# Patient Record
Sex: FEMALE | Race: WHITE | NOT HISPANIC OR LATINO | Employment: OTHER | URBAN - METROPOLITAN AREA
[De-identification: names, ages, dates, MRNs, and addresses within clinical notes are randomized per-mention and may not be internally consistent; named-entity substitution may affect disease eponyms.]

---

## 2023-08-31 PROBLEM — N18.9 CHRONIC RENAL FAILURE SYNDROME: Status: ACTIVE | Noted: 2023-08-31

## 2023-08-31 PROBLEM — M31.6 TEMPORAL ARTERITIS (MULTI): Status: ACTIVE | Noted: 2023-08-31

## 2023-08-31 PROBLEM — E55.9 VITAMIN D DEFICIENCY: Status: ACTIVE | Noted: 2023-08-31

## 2023-08-31 PROBLEM — H00.011 HORDEOLUM EXTERNUM OF RIGHT UPPER EYELID: Status: ACTIVE | Noted: 2023-08-31

## 2023-08-31 PROBLEM — F32.A DEPRESSION: Status: ACTIVE | Noted: 2023-08-31

## 2023-08-31 PROBLEM — E78.2 MIXED HYPERLIPIDEMIA: Status: ACTIVE | Noted: 2023-08-31

## 2023-08-31 PROBLEM — E11.65 HYPERGLYCEMIA DUE TO TYPE 2 DIABETES MELLITUS (MULTI): Status: ACTIVE | Noted: 2023-08-31

## 2023-08-31 PROBLEM — F41.9 ANXIETY: Status: ACTIVE | Noted: 2023-08-31

## 2023-08-31 PROBLEM — G62.9 PERIPHERAL NEUROPATHY: Status: ACTIVE | Noted: 2023-08-31

## 2023-08-31 PROBLEM — K57.92 DIVERTICULITIS: Status: ACTIVE | Noted: 2023-08-31

## 2023-08-31 PROBLEM — I10 ESSENTIAL HYPERTENSION: Status: ACTIVE | Noted: 2023-08-31

## 2023-08-31 RX ORDER — INSULIN DETEMIR 100 [IU]/ML
INJECTION, SOLUTION SUBCUTANEOUS
COMMUNITY
Start: 2023-05-19 | End: 2023-11-21 | Stop reason: WASHOUT

## 2023-08-31 RX ORDER — PREDNISONE 5 MG/1
2.5 TABLET ORAL DAILY
COMMUNITY
Start: 2022-03-31 | End: 2023-11-21 | Stop reason: ALTCHOICE

## 2023-08-31 RX ORDER — SERTRALINE HYDROCHLORIDE 100 MG/1
TABLET, FILM COATED ORAL
COMMUNITY
End: 2023-11-21 | Stop reason: WASHOUT

## 2023-08-31 RX ORDER — METFORMIN HYDROCHLORIDE 500 MG/1
TABLET, EXTENDED RELEASE ORAL
COMMUNITY
Start: 2019-10-16 | End: 2023-11-21 | Stop reason: WASHOUT

## 2023-08-31 RX ORDER — LIDOCAINE 50 MG/G
PATCH TOPICAL
COMMUNITY
Start: 2021-12-30

## 2023-08-31 RX ORDER — ONDANSETRON 4 MG/1
TABLET, FILM COATED ORAL
COMMUNITY
Start: 2017-11-16 | End: 2023-11-21 | Stop reason: WASHOUT

## 2023-08-31 RX ORDER — LISINOPRIL 5 MG/1
TABLET ORAL
COMMUNITY
End: 2023-11-21 | Stop reason: WASHOUT

## 2023-08-31 RX ORDER — SARILUMAB 200 MG/1.14ML
INJECTION, SOLUTION SUBCUTANEOUS
COMMUNITY
End: 2023-11-21 | Stop reason: WASHOUT

## 2023-08-31 RX ORDER — LOVASTATIN 40 MG/1
TABLET ORAL
COMMUNITY
End: 2023-11-21 | Stop reason: WASHOUT

## 2023-08-31 RX ORDER — METOPROLOL SUCCINATE 50 MG/1
TABLET, EXTENDED RELEASE ORAL
COMMUNITY
End: 2023-11-21 | Stop reason: WASHOUT

## 2023-08-31 RX ORDER — ZOLPIDEM TARTRATE 5 MG/1
TABLET ORAL
COMMUNITY
Start: 2023-05-04 | End: 2023-11-21 | Stop reason: SDUPTHER

## 2023-08-31 RX ORDER — VIT C/E/ZN/COPPR/LUTEIN/ZEAXAN 250MG-90MG
CAPSULE ORAL
COMMUNITY
End: 2023-11-21 | Stop reason: SDUPTHER

## 2023-08-31 RX ORDER — GENTAMICIN SULFATE 3 MG/ML
SOLUTION/ DROPS OPHTHALMIC
COMMUNITY
Start: 2019-06-06 | End: 2023-11-21 | Stop reason: WASHOUT

## 2023-08-31 RX ORDER — CLONAZEPAM 1 MG/1
TABLET ORAL
COMMUNITY
Start: 2022-11-09 | End: 2023-11-21 | Stop reason: WASHOUT

## 2023-08-31 RX ORDER — ASPIRIN 81 MG/1
TABLET ORAL
COMMUNITY
End: 2024-02-21 | Stop reason: SDUPTHER

## 2023-08-31 RX ORDER — ACETAMINOPHEN AND CODEINE PHOSPHATE 300; 30 MG/1; MG/1
TABLET ORAL
COMMUNITY
Start: 2017-03-26 | End: 2023-11-21 | Stop reason: WASHOUT

## 2023-10-03 ENCOUNTER — PHARMACY VISIT (OUTPATIENT)
Dept: PHARMACY | Facility: CLINIC | Age: 83
End: 2023-10-03
Payer: COMMERCIAL

## 2023-10-03 PROCEDURE — RXMED WILLOW AMBULATORY MEDICATION CHARGE

## 2023-10-24 ENCOUNTER — PHARMACY VISIT (OUTPATIENT)
Dept: PHARMACY | Facility: CLINIC | Age: 83
End: 2023-10-24
Payer: COMMERCIAL

## 2023-10-24 PROCEDURE — RXMED WILLOW AMBULATORY MEDICATION CHARGE

## 2023-10-26 ENCOUNTER — PHARMACY VISIT (OUTPATIENT)
Dept: PHARMACY | Facility: CLINIC | Age: 83
End: 2023-10-26
Payer: COMMERCIAL

## 2023-10-26 PROCEDURE — RXMED WILLOW AMBULATORY MEDICATION CHARGE

## 2023-10-28 ENCOUNTER — PHARMACY VISIT (OUTPATIENT)
Dept: PHARMACY | Facility: CLINIC | Age: 83
End: 2023-10-28
Payer: COMMERCIAL

## 2023-10-28 PROCEDURE — RXOTC WILLOW AMBULATORY OTC CHARGE

## 2023-10-28 PROCEDURE — RXMED WILLOW AMBULATORY MEDICATION CHARGE

## 2023-10-31 ENCOUNTER — TELEPHONE (OUTPATIENT)
Dept: PRIMARY CARE | Facility: CLINIC | Age: 83
End: 2023-10-31

## 2023-10-31 ENCOUNTER — LAB REQUISITION (OUTPATIENT)
Dept: LAB | Facility: HOSPITAL | Age: 83
End: 2023-10-31
Payer: MEDICARE

## 2023-10-31 DIAGNOSIS — R30.0 DYSURIA: Primary | ICD-10-CM

## 2023-10-31 DIAGNOSIS — R31.9 HEMATURIA, UNSPECIFIED TYPE: ICD-10-CM

## 2023-10-31 DIAGNOSIS — N39.0 URINARY TRACT INFECTION, SITE NOT SPECIFIED: ICD-10-CM

## 2023-10-31 PROCEDURE — 87186 SC STD MICRODIL/AGAR DIL: CPT

## 2023-10-31 PROCEDURE — 87086 URINE CULTURE/COLONY COUNT: CPT

## 2023-10-31 NOTE — TELEPHONE ENCOUNTER
Pt c/o burning and frequency with urination.  Pt suspects UTI and requesting order for UA    Also complaining of approx 15# weight loss in last 3 months without trying.  Family starting to notice and pt concerned.  Would like to be seen for further eval.  Please advise.  Ph: [phone]

## 2023-11-01 NOTE — TELEPHONE ENCOUNTER
Attempted to reach patient a third time to schedule appointment for tomorrow at 2:30. Still getting a busy signal.

## 2023-11-02 NOTE — TELEPHONE ENCOUNTER
Attempted again to reach patient to schedule. Phone is still busy. No alternative number. Will address for now and wait for a call back.

## 2023-11-03 LAB — BACTERIA UR CULT: ABNORMAL

## 2023-11-21 ENCOUNTER — OFFICE VISIT (OUTPATIENT)
Dept: PRIMARY CARE | Facility: CLINIC | Age: 83
End: 2023-11-21
Payer: MEDICARE

## 2023-11-21 ENCOUNTER — PHARMACY VISIT (OUTPATIENT)
Dept: PHARMACY | Facility: CLINIC | Age: 83
End: 2023-11-21
Payer: COMMERCIAL

## 2023-11-21 VITALS
OXYGEN SATURATION: 94 % | DIASTOLIC BLOOD PRESSURE: 82 MMHG | HEART RATE: 82 BPM | SYSTOLIC BLOOD PRESSURE: 138 MMHG | TEMPERATURE: 97.6 F | BODY MASS INDEX: 25.68 KG/M2 | WEIGHT: 136 LBS | HEIGHT: 61 IN

## 2023-11-21 DIAGNOSIS — R30.0 DYSURIA: ICD-10-CM

## 2023-11-21 DIAGNOSIS — Z79.4 TYPE 2 DIABETES MELLITUS WITH OTHER SPECIFIED COMPLICATION, WITH LONG-TERM CURRENT USE OF INSULIN (MULTI): Primary | ICD-10-CM

## 2023-11-21 DIAGNOSIS — F32.A DEPRESSION, UNSPECIFIED DEPRESSION TYPE: ICD-10-CM

## 2023-11-21 DIAGNOSIS — I10 ESSENTIAL HYPERTENSION: ICD-10-CM

## 2023-11-21 DIAGNOSIS — E78.2 MIXED HYPERLIPIDEMIA: ICD-10-CM

## 2023-11-21 DIAGNOSIS — Z01.89 ENCOUNTER FOR ROUTINE LABORATORY TESTING: ICD-10-CM

## 2023-11-21 DIAGNOSIS — Z86.2 HISTORY OF ANEMIA: ICD-10-CM

## 2023-11-21 DIAGNOSIS — F41.9 ANXIETY: ICD-10-CM

## 2023-11-21 DIAGNOSIS — E11.69 TYPE 2 DIABETES MELLITUS WITH OTHER SPECIFIED COMPLICATION, WITH LONG-TERM CURRENT USE OF INSULIN (MULTI): Primary | ICD-10-CM

## 2023-11-21 DIAGNOSIS — G63 POLYNEUROPATHY ASSOCIATED WITH UNDERLYING DISEASE (MULTI): ICD-10-CM

## 2023-11-21 DIAGNOSIS — E55.9 VITAMIN D DEFICIENCY: ICD-10-CM

## 2023-11-21 PROBLEM — H00.011 HORDEOLUM EXTERNUM OF RIGHT UPPER EYELID: Status: RESOLVED | Noted: 2023-08-31 | Resolved: 2023-11-21

## 2023-11-21 PROBLEM — K57.90 DIVERTICULOSIS: Status: ACTIVE | Noted: 2023-11-21

## 2023-11-21 PROBLEM — Z87.19 HISTORY OF DIVERTICULITIS: Status: ACTIVE | Noted: 2023-11-21

## 2023-11-21 PROBLEM — K57.92 DIVERTICULITIS: Status: RESOLVED | Noted: 2023-08-31 | Resolved: 2023-11-21

## 2023-11-21 PROBLEM — N18.9 CHRONIC RENAL FAILURE SYNDROME: Status: RESOLVED | Noted: 2023-08-31 | Resolved: 2023-11-21

## 2023-11-21 PROBLEM — E11.9 TYPE 2 DIABETES MELLITUS (MULTI): Status: ACTIVE | Noted: 2023-08-31

## 2023-11-21 PROCEDURE — 3079F DIAST BP 80-89 MM HG: CPT | Performed by: STUDENT IN AN ORGANIZED HEALTH CARE EDUCATION/TRAINING PROGRAM

## 2023-11-21 PROCEDURE — 1126F AMNT PAIN NOTED NONE PRSNT: CPT | Performed by: STUDENT IN AN ORGANIZED HEALTH CARE EDUCATION/TRAINING PROGRAM

## 2023-11-21 PROCEDURE — 1159F MED LIST DOCD IN RCRD: CPT | Performed by: STUDENT IN AN ORGANIZED HEALTH CARE EDUCATION/TRAINING PROGRAM

## 2023-11-21 PROCEDURE — 99214 OFFICE O/P EST MOD 30 MIN: CPT | Performed by: STUDENT IN AN ORGANIZED HEALTH CARE EDUCATION/TRAINING PROGRAM

## 2023-11-21 PROCEDURE — 1160F RVW MEDS BY RX/DR IN RCRD: CPT | Performed by: STUDENT IN AN ORGANIZED HEALTH CARE EDUCATION/TRAINING PROGRAM

## 2023-11-21 PROCEDURE — RXMED WILLOW AMBULATORY MEDICATION CHARGE

## 2023-11-21 PROCEDURE — 95251 CONT GLUC MNTR ANALYSIS I&R: CPT | Performed by: STUDENT IN AN ORGANIZED HEALTH CARE EDUCATION/TRAINING PROGRAM

## 2023-11-21 PROCEDURE — 3075F SYST BP GE 130 - 139MM HG: CPT | Performed by: STUDENT IN AN ORGANIZED HEALTH CARE EDUCATION/TRAINING PROGRAM

## 2023-11-21 PROCEDURE — 1036F TOBACCO NON-USER: CPT | Performed by: STUDENT IN AN ORGANIZED HEALTH CARE EDUCATION/TRAINING PROGRAM

## 2023-11-21 RX ORDER — INSULIN GLARGINE 100 [IU]/ML
INJECTION, SOLUTION SUBCUTANEOUS
Start: 2023-11-21 | End: 2024-01-03 | Stop reason: SDUPTHER

## 2023-11-21 RX ORDER — LISINOPRIL 5 MG/1
5 TABLET ORAL DAILY
Qty: 90 TABLET | Refills: 3 | Status: SHIPPED | OUTPATIENT
Start: 2023-11-21 | End: 2024-02-21 | Stop reason: SDUPTHER

## 2023-11-21 RX ORDER — SERTRALINE HYDROCHLORIDE 100 MG/1
100 TABLET, FILM COATED ORAL DAILY
Start: 2023-11-21 | End: 2024-02-21 | Stop reason: SDUPTHER

## 2023-11-21 RX ORDER — LISINOPRIL 5 MG/1
5 TABLET ORAL DAILY
Start: 2023-11-21 | End: 2023-11-21 | Stop reason: SDUPTHER

## 2023-11-21 RX ORDER — LOVASTATIN 40 MG/1
80 TABLET ORAL DAILY
Qty: 180 TABLET | Refills: 3 | Status: SHIPPED | OUTPATIENT
Start: 2023-11-21 | End: 2024-02-21 | Stop reason: SDUPTHER

## 2023-11-21 RX ORDER — ZOLPIDEM TARTRATE 5 MG/1
5 TABLET ORAL NIGHTLY PRN
Qty: 90 TABLET | Refills: 1 | Status: SHIPPED | OUTPATIENT
Start: 2023-11-21 | End: 2024-04-04 | Stop reason: SDUPTHER

## 2023-11-21 RX ORDER — METOPROLOL SUCCINATE 50 MG/1
50 TABLET, EXTENDED RELEASE ORAL DAILY
Start: 2023-11-21 | End: 2024-02-21 | Stop reason: SDUPTHER

## 2023-11-21 RX ORDER — INSULIN GLARGINE 100 [IU]/ML
INJECTION, SOLUTION SUBCUTANEOUS
Start: 2023-11-21 | End: 2023-11-21 | Stop reason: SDUPTHER

## 2023-11-21 RX ORDER — VIT C/E/ZN/COPPR/LUTEIN/ZEAXAN 250MG-90MG
75 CAPSULE ORAL DAILY
Start: 2023-11-21 | End: 2024-02-21 | Stop reason: ALTCHOICE

## 2023-11-21 RX ORDER — LOVASTATIN 40 MG/1
80 TABLET ORAL DAILY
Start: 2023-11-21 | End: 2023-11-21 | Stop reason: SDUPTHER

## 2023-11-21 RX ORDER — CEPHALEXIN 500 MG/1
500 CAPSULE ORAL 4 TIMES DAILY
Qty: 40 CAPSULE | Refills: 0 | Status: SHIPPED | OUTPATIENT
Start: 2023-11-21 | End: 2023-12-01

## 2023-11-21 RX ORDER — METFORMIN HYDROCHLORIDE 500 MG/1
500 TABLET, EXTENDED RELEASE ORAL
Start: 2023-11-21 | End: 2024-02-21 | Stop reason: ALTCHOICE

## 2023-11-21 RX ORDER — ZOLPIDEM TARTRATE 5 MG/1
5 TABLET ORAL NIGHTLY PRN
Start: 2023-11-21 | End: 2023-11-21 | Stop reason: SDUPTHER

## 2023-11-21 ASSESSMENT — ENCOUNTER SYMPTOMS
CARDIOVASCULAR NEGATIVE: 1
CONSTITUTIONAL NEGATIVE: 1
RESPIRATORY NEGATIVE: 1
GASTROINTESTINAL NEGATIVE: 1

## 2023-11-21 ASSESSMENT — PAIN SCALES - GENERAL: PAINLEVEL: 0-NO PAIN

## 2023-11-21 NOTE — PROGRESS NOTES
[facility]: [facility]  PROGRESS NOTE      Sheila Sanchez is a 83 y.o. female that is presenting today for Follow-up (OVERDUE ER FOLLOW UP).    Assessment/Plan   Diagnoses and all orders for this visit:  Type 2 diabetes mellitus with other specified complication, with long-term current use of insulin (CMS/[facility])  Comments:  Not tolerating metformin. Decrease dosing.  Levemir discontinued. Transition to lantus. Will increase insulin slightly to adjust for decreased metformin.  Orders:  -     metFORMIN XR (Glucophage-XR) 500 mg 24 hr tablet; Take 1 tablet (500 mg) by mouth 2 times a day with meals.  -     insulin glargine (Lantus) 100 unit/mL injection; Inject 52 Units under the skin once daily in the morning AND 35 Units once daily at bedtime.  -     Follow Up In Primary Care; Future  -     Hemoglobin A1C; Future  -     Albumin , Urine Random; Future  Essential hypertension  Comments:  No changes at this time.  Orders:  -     metoprolol succinate XL (Toprol-XL) 50 mg 24 hr tablet; Take 1 tablet (50 mg) by mouth once daily.  -     lisinopril 5 mg tablet; Take 1 tablet (5 mg) by mouth once daily.  -     Basic Metabolic Panel; Future  Mixed hyperlipidemia  -     lovastatin (Mevacor) 40 mg tablet; Take 2 tablets (80 mg) by mouth once daily.  -     Hepatic Function Panel; Future  Polyneuropathy associated with underlying disease (CMS/[facility])  Comments:  Stable. No additions at this time.  Depression, unspecified depression type  Comments:  No changes at this time.  Orders:  -     sertraline (Zoloft) 100 mg tablet; Take 1 tablet (100 mg) by mouth once daily.  -     zolpidem (Ambien) 5 mg tablet; Take 1 tablet (5 mg) by mouth as needed at bedtime for sleep.  Vitamin D deficiency  -     cholecalciferol (Vitamin D-3) 25 MCG (1000 UT) capsule; Take 3 capsules (75 mcg) by mouth once daily.  Anxiety  Comments:  No changes at this time.  Orders:  -     sertraline (Zoloft) 100 mg tablet; Take 1 tablet (100  mg) by mouth once daily.  -     zolpidem (Ambien) 5 mg tablet; Take 1 tablet (5 mg) by mouth as needed at bedtime for sleep.  Dysuria  Comments:  Proteus in urine. Patient treated with macrobid followed by ciprofloxacin but still having symptoms.   Will recheck UA.  Orders:  -     POCT UA (nonautomated) manually resulted  -     cephalexin (Keflex) 500 mg capsule; Take 1 capsule (500 mg) by mouth 4 times a day for 10 days.  Encounter for routine laboratory testing  -     CBC and Auto Differential; Future  -     Basic Metabolic Panel; Future  -     Hepatic Function Panel; Future  -     Hemoglobin A1C; Future  -     Albumin , Urine Random; Future  History of anemia  -     CBC and Auto Differential; Future    Subjective   - The patient otherwise feels well and denies any acute symptoms or concerns at this time.  - The patient denies any changes or progression of their chronic medical problems.  - The patient denies any problems or concerns with their medications.      Review of Systems   Constitutional: Negative.    Respiratory: Negative.     Cardiovascular: Negative.    Gastrointestinal: Negative.       Objective   Vitals:    11/21/23 1009   BP: 138/82   Pulse: 82   Temp: 36.4 °C (97.6 °F)   SpO2: 94%      Body mass index is 25.71 kg/m².  Physical Exam  Constitutional:       General: She is not in acute distress.  Neck:      Vascular: No carotid bruit.   Cardiovascular:      Rate and Rhythm: Normal rate and regular rhythm.      Heart sounds: Normal heart sounds.   Pulmonary:      Effort: Pulmonary effort is normal.      Breath sounds: Normal breath sounds.   Musculoskeletal:         General: No swelling.   Neurological:      Mental Status: She is alert. Mental status is at baseline.   Psychiatric:         Mood and Affect: Mood normal.       Diagnostic Results   Lab Results   Component Value Date    GLUCOSE 184 (H) 02/24/2023    CALCIUM 9.6 02/24/2023     02/24/2023    K 4.1 02/24/2023    CO2 25 02/24/2023    CL  "100 02/24/2023    BUN 21 02/24/2023    CREATININE 0.9 02/24/2023     Lab Results   Component Value Date    ALT 15 02/24/2023    AST 19 02/24/2023    ALKPHOS 34 (L) 02/24/2023    BILITOT 0.5 02/24/2023     Lab Results   Component Value Date    WBC 7.6 02/24/2023    HGB 12.8 02/24/2023    HCT 38.8 02/24/2023    MCV 89.8 02/24/2023     02/24/2023     Lab Results   Component Value Date    CHOL 181 02/24/2023    CHOL 172 03/29/2022    CHOL 177 09/28/2021     Lab Results   Component Value Date    HDL 46 (L) 02/24/2023    HDL 47 (L) 03/29/2022    HDL 44 (L) 09/28/2021     Lab Results   Component Value Date    LDLCALC 71 02/24/2023    LDLCALC 73 03/29/2022    LDLCALC 77 09/28/2021     Lab Results   Component Value Date    TRIG 319 (H) 02/24/2023    TRIG 262 (H) 03/29/2022    TRIG 282 (H) 09/28/2021     No components found for: \"CHOLHDL\"  Lab Results   Component Value Date    HGBA1C 7.9 (H) 03/29/2022     Other labs not included in the list above were reviewed either before or during this encounter.    History    No past medical history on file.  No past surgical history on file.  No family history on file.  Social History     Socioeconomic History    Marital status:      Spouse name: Not on file    Number of children: Not on file    Years of education: Not on file    Highest education level: Not on file   Occupational History    Not on file   Tobacco Use    Smoking status: Never    Smokeless tobacco: Never   Substance and Sexual Activity    Alcohol use: Never    Drug use: Never    Sexual activity: Not on file   Other Topics Concern    Not on file   Social History Narrative    Not on file     Social Determinants of Health     Financial Resource Strain: Not on file   Food Insecurity: Not on file   Transportation Needs: Not on file   Physical Activity: Not on file   Stress: Not on file   Social Connections: Not on file   Intimate Partner Violence: Not on file   Housing Stability: Not on file     Allergies "   Allergen Reactions    Alendronate Other     stomach upset    Ibandronate Other     stomach upset    Meperidine Other     stomach upset    Tetanus Toxoid Adsorbed Unknown     Current Outpatient Medications on File Prior to Visit   Medication Sig Dispense Refill    aspirin 81 mg EC tablet 1 tablet Orally Once a day      blood sugar diagnostic strip as directed every 12 hours for 90 days      blood sugar diagnostic strip USE AS DIRECTED EVERY 12 HOURS 200 each 3    FreeStyle Júnior sensor system kit USE AS DIRECTED TO MONITOR BLOOD SUGAR AND CHANGE EVERY 14 DAYS 2 each 11    lidocaine (Lidoderm) 5 % patch 1 patch remove after 12 hours Externally Once a day prn      [DISCONTINUED] cholecalciferol (Vitamin D-3) 25 MCG (1000 UT) capsule 3 capsules Orally Once a day      [DISCONTINUED] insulin detemir (Levemir Flextouch) 100 unit/mL (3 mL) pen INJECT 50 UNITS SUBCUTANEOUSLY EVERY MORNING AND 35 UNITS SUBCUTANEOUSLY EVERY EVENING 45 mL 1    [DISCONTINUED] lisinopril 5 mg tablet TAKE 1 TABLET BY MOUTH ONE TIME DAILY 90 tablet 3    [DISCONTINUED] lovastatin (Mevacor) 40 mg tablet TAKE 2 TABLETS BY MOUTH ONE TIME DAILY 180 tablet 3    [DISCONTINUED] metFORMIN XR (Glucophage-XR) 500 mg 24 hr tablet TAKE 2 TABLETS BY MOUTH EVERY MORNING AND TAKE 1 TABLET BY MOUTH EVERY EVENING 135 tablet 3    [DISCONTINUED] metoprolol succinate XL (Toprol-XL) 50 mg 24 hr tablet 03KE 1 TABLET BY MOUTH ONE TIME DAILY 90 tablet 3    [DISCONTINUED] predniSONE (Deltasone) 5 mg tablet Take 0.5 tablets (2.5 mg) by mouth once daily.      [DISCONTINUED] sertraline (Zoloft) 100 mg tablet TAKE 1 TABLET BY MOUTH ONE TIME DAILY 90 tablet 3    [DISCONTINUED] zolpidem (Ambien) 5 mg tablet 1 tablet at bedtime as needed Orally Once a day      [DISCONTINUED] acetaminophen-codeine (Tylenol w/ Codeine #3) 300-30 mg tablet Take 1-2 tablets every 6-8 hours as needed      [DISCONTINUED] amoxicillin (Amoxil) 500 mg capsule TAKE 1 CAPSULE BY MOUTH THREE TIMES A DAY  UNTIL GONE 21 capsule 0    [DISCONTINUED] clonazePAM (KlonoPIN) 1 mg tablet 1 tablet as needed Orally Twice a day      [DISCONTINUED] gentamicin (Garamycin) 0.3 % ophthalmic solution 2-3 drops in medial canthus of eye every 4 hours while awake x 5 days      [DISCONTINUED] insulin detemir (Levemir FlexPen) 100 unit/mL (3 mL) pen as directed Subcutaneous 50u AM; 35u PM      [DISCONTINUED] insulin detemir (Levemir Flextouch) 100 unit/mL (3 mL) pen INJECT 50 UNITS SUBCUTANEOUSLY EVERY MORNING AND 35 UNITS SUBCUTANEOUSLY EVERY EVENING 45 mL 1    [DISCONTINUED] lisinopril 5 mg tablet 1 tablet Orally Once a day for 90 days      [DISCONTINUED] lovastatin (Mevacor) 40 mg tablet 2 tablets Orally Once a day for 90 days      [DISCONTINUED] metFORMIN  mg 24 hr tablet as directed Orally 2 tablets AM; 1 tablet PM      [DISCONTINUED] metoprolol succinate XL (Toprol XL) 50 mg 24 hr tablet 1 tablet Orally Once a day      [DISCONTINUED] mupirocin (Bactroban) 2 % ointment APPLY TO AFFECTED AREA TWO TIMES A DAY 22 g 0    [DISCONTINUED] ondansetron (Zofran) 4 mg tablet 1 tablet Orally every 6 hours prn      [DISCONTINUED] predniSONE (Deltasone) 5 mg tablet TAKE 1 TABLET BY MOUTH ONE TIME DAILY. 90 tablet 1    [DISCONTINUED] sarilumab (Kevzara) 200 mg/1.14 mL injection as directed Subcutaneous once a month      [DISCONTINUED] sertraline (Zoloft) 100 mg tablet 1 tablet Orally Once a day      [DISCONTINUED] zolpidem (Ambien) 5 mg tablet TAKE 1 TABLET BY MOUTH EVERY DAY AT BEDTIME. 90 tablet 1     No current facility-administered medications on file prior to visit.     Immunization History   Administered Date(s) Administered    DT (pediatric) 08/21/2023    Flu vaccine, quadrivalent, high-dose, preservative free, age 65y+ (FLUZONE) 09/19/2021, 10/05/2022    Influenza, High Dose Seasonal, Preservative Free 12/10/2019, 09/28/2020    Moderna COVID-19 vaccine, bivalent, blue cap/gray label *Check age/dose* 10/09/2022    Moderna SARS-CoV-2  Vaccination 01/22/2021, 02/19/2021, 11/13/2021    Pneumococcal conjugate vaccine, 13-valent (PREVNAR 13) 12/05/2018    Pneumococcal polysaccharide vaccine, 23-valent, age 2 years and older (PNEUMOVAX 23) 10/14/2004, 08/01/2012, 09/28/2020    Tdap vaccine, age 7 year and older (BOOSTRIX) 09/28/2020    Zoster vaccine, recombinant, adult (SHINGRIX) 03/02/2021    Zoster, live 02/01/2013     Patient's medical history was reviewed and updated either before or during this encounter.       Rg Traore MD

## 2023-11-25 ENCOUNTER — PHARMACY VISIT (OUTPATIENT)
Dept: PHARMACY | Facility: CLINIC | Age: 83
End: 2023-11-25
Payer: COMMERCIAL

## 2023-11-25 PROCEDURE — RXMED WILLOW AMBULATORY MEDICATION CHARGE

## 2023-12-16 ENCOUNTER — PHARMACY VISIT (OUTPATIENT)
Dept: PHARMACY | Facility: CLINIC | Age: 83
End: 2023-12-16
Payer: COMMERCIAL

## 2023-12-16 DIAGNOSIS — E11.69 TYPE 2 DIABETES MELLITUS WITH OTHER SPECIFIED COMPLICATION, WITH LONG-TERM CURRENT USE OF INSULIN (MULTI): Primary | ICD-10-CM

## 2023-12-16 DIAGNOSIS — Z79.4 TYPE 2 DIABETES MELLITUS WITH OTHER SPECIFIED COMPLICATION, WITH LONG-TERM CURRENT USE OF INSULIN (MULTI): Primary | ICD-10-CM

## 2023-12-16 PROCEDURE — RXMED WILLOW AMBULATORY MEDICATION CHARGE

## 2023-12-18 PROCEDURE — RXMED WILLOW AMBULATORY MEDICATION CHARGE

## 2023-12-18 RX ORDER — METFORMIN HYDROCHLORIDE 500 MG/1
TABLET, EXTENDED RELEASE ORAL
Qty: 270 TABLET | Refills: 3 | Status: SHIPPED | OUTPATIENT
Start: 2023-12-18 | End: 2024-02-21 | Stop reason: SDUPTHER

## 2023-12-21 ENCOUNTER — PHARMACY VISIT (OUTPATIENT)
Dept: PHARMACY | Facility: CLINIC | Age: 83
End: 2023-12-21
Payer: COMMERCIAL

## 2024-01-03 ENCOUNTER — TELEPHONE (OUTPATIENT)
Dept: PRIMARY CARE | Facility: CLINIC | Age: 84
End: 2024-01-03
Payer: MEDICARE

## 2024-01-03 DIAGNOSIS — E11.69 TYPE 2 DIABETES MELLITUS WITH OTHER SPECIFIED COMPLICATION, WITH LONG-TERM CURRENT USE OF INSULIN (MULTI): ICD-10-CM

## 2024-01-03 DIAGNOSIS — Z79.4 TYPE 2 DIABETES MELLITUS WITH OTHER SPECIFIED COMPLICATION, WITH LONG-TERM CURRENT USE OF INSULIN (MULTI): ICD-10-CM

## 2024-01-03 PROCEDURE — RXMED WILLOW AMBULATORY MEDICATION CHARGE

## 2024-01-03 RX ORDER — INSULIN GLARGINE 100 [IU]/ML
INJECTION, SOLUTION SUBCUTANEOUS
Qty: 15 ML | Refills: 3 | Status: SHIPPED | OUTPATIENT
Start: 2024-01-03 | End: 2024-02-08 | Stop reason: SDUPTHER

## 2024-01-06 ENCOUNTER — PHARMACY VISIT (OUTPATIENT)
Dept: PHARMACY | Facility: CLINIC | Age: 84
End: 2024-01-06
Payer: COMMERCIAL

## 2024-01-06 PROCEDURE — RXMED WILLOW AMBULATORY MEDICATION CHARGE

## 2024-01-22 PROCEDURE — RXMED WILLOW AMBULATORY MEDICATION CHARGE

## 2024-01-23 ENCOUNTER — PHARMACY VISIT (OUTPATIENT)
Dept: PHARMACY | Facility: CLINIC | Age: 84
End: 2024-01-23
Payer: COMMERCIAL

## 2024-02-08 ENCOUNTER — PHARMACY VISIT (OUTPATIENT)
Dept: PHARMACY | Facility: CLINIC | Age: 84
End: 2024-02-08
Payer: COMMERCIAL

## 2024-02-08 DIAGNOSIS — E11.69 TYPE 2 DIABETES MELLITUS WITH OTHER SPECIFIED COMPLICATION, WITH LONG-TERM CURRENT USE OF INSULIN (MULTI): Primary | ICD-10-CM

## 2024-02-08 DIAGNOSIS — Z79.4 TYPE 2 DIABETES MELLITUS WITH OTHER SPECIFIED COMPLICATION, WITH LONG-TERM CURRENT USE OF INSULIN (MULTI): Primary | ICD-10-CM

## 2024-02-08 DIAGNOSIS — Z79.4 TYPE 2 DIABETES MELLITUS WITH OTHER SPECIFIED COMPLICATION, WITH LONG-TERM CURRENT USE OF INSULIN (MULTI): ICD-10-CM

## 2024-02-08 DIAGNOSIS — E11.69 TYPE 2 DIABETES MELLITUS WITH OTHER SPECIFIED COMPLICATION, WITH LONG-TERM CURRENT USE OF INSULIN (MULTI): ICD-10-CM

## 2024-02-08 PROCEDURE — RXMED WILLOW AMBULATORY MEDICATION CHARGE

## 2024-02-08 RX ORDER — FLASH GLUCOSE SENSOR
KIT MISCELLANEOUS
Qty: 2 EACH | Refills: 0 | OUTPATIENT
Start: 2024-02-08 | End: 2024-02-21 | Stop reason: SDUPTHER

## 2024-02-09 RX ORDER — FLASH GLUCOSE SENSOR
KIT MISCELLANEOUS
Qty: 2 EACH | Refills: 11 | Status: SHIPPED | OUTPATIENT
Start: 2024-02-09 | End: 2025-02-07

## 2024-02-09 RX ORDER — INSULIN GLARGINE 100 [IU]/ML
INJECTION, SOLUTION SUBCUTANEOUS
Qty: 15 ML | Refills: 3 | Status: SHIPPED | OUTPATIENT
Start: 2024-02-09 | End: 2024-02-21 | Stop reason: SDUPTHER

## 2024-02-21 ENCOUNTER — OFFICE VISIT (OUTPATIENT)
Dept: PRIMARY CARE | Facility: CLINIC | Age: 84
End: 2024-02-21
Payer: MEDICARE

## 2024-02-21 ENCOUNTER — LAB (OUTPATIENT)
Dept: LAB | Facility: LAB | Age: 84
End: 2024-02-21
Payer: MEDICARE

## 2024-02-21 VITALS
BODY MASS INDEX: 27.68 KG/M2 | HEIGHT: 60 IN | DIASTOLIC BLOOD PRESSURE: 80 MMHG | HEART RATE: 79 BPM | OXYGEN SATURATION: 97 % | SYSTOLIC BLOOD PRESSURE: 130 MMHG | WEIGHT: 141 LBS | TEMPERATURE: 96.1 F

## 2024-02-21 DIAGNOSIS — Z01.89 ENCOUNTER FOR ROUTINE LABORATORY TESTING: ICD-10-CM

## 2024-02-21 DIAGNOSIS — E11.69 TYPE 2 DIABETES MELLITUS WITH OTHER SPECIFIED COMPLICATION, WITH LONG-TERM CURRENT USE OF INSULIN (MULTI): Primary | ICD-10-CM

## 2024-02-21 DIAGNOSIS — E55.9 VITAMIN D DEFICIENCY: ICD-10-CM

## 2024-02-21 DIAGNOSIS — E78.2 MIXED HYPERLIPIDEMIA: ICD-10-CM

## 2024-02-21 DIAGNOSIS — Z79.4 TYPE 2 DIABETES MELLITUS WITH OTHER SPECIFIED COMPLICATION, WITH LONG-TERM CURRENT USE OF INSULIN (MULTI): ICD-10-CM

## 2024-02-21 DIAGNOSIS — F32.A DEPRESSION, UNSPECIFIED DEPRESSION TYPE: ICD-10-CM

## 2024-02-21 DIAGNOSIS — R30.0 DYSURIA: ICD-10-CM

## 2024-02-21 DIAGNOSIS — I10 ESSENTIAL HYPERTENSION: ICD-10-CM

## 2024-02-21 DIAGNOSIS — M31.6 TEMPORAL ARTERITIS (MULTI): ICD-10-CM

## 2024-02-21 DIAGNOSIS — Z79.4 TYPE 2 DIABETES MELLITUS WITH OTHER SPECIFIED COMPLICATION, WITH LONG-TERM CURRENT USE OF INSULIN (MULTI): Primary | ICD-10-CM

## 2024-02-21 DIAGNOSIS — R31.9 HEMATURIA, UNSPECIFIED TYPE: ICD-10-CM

## 2024-02-21 DIAGNOSIS — E11.69 TYPE 2 DIABETES MELLITUS WITH OTHER SPECIFIED COMPLICATION, WITH LONG-TERM CURRENT USE OF INSULIN (MULTI): ICD-10-CM

## 2024-02-21 DIAGNOSIS — G63 POLYNEUROPATHY ASSOCIATED WITH UNDERLYING DISEASE (MULTI): ICD-10-CM

## 2024-02-21 DIAGNOSIS — F41.9 ANXIETY: ICD-10-CM

## 2024-02-21 LAB
25(OH)D3 SERPL-MCNC: 23 NG/ML (ref 31–100)
ALBUMIN SERPL-MCNC: 4.3 G/DL (ref 3.5–5)
ALP BLD-CCNC: 60 U/L (ref 35–125)
ALT SERPL-CCNC: 17 U/L (ref 5–40)
ANION GAP SERPL CALC-SCNC: 13 MMOL/L
APPEARANCE UR: CLEAR
AST SERPL-CCNC: 20 U/L (ref 5–40)
BASOPHILS # BLD AUTO: 0.09 X10*3/UL (ref 0–0.1)
BASOPHILS NFR BLD AUTO: 0.9 %
BILIRUB DIRECT SERPL-MCNC: <0.2 MG/DL (ref 0–0.2)
BILIRUB SERPL-MCNC: 0.2 MG/DL (ref 0.1–1.2)
BILIRUB UR STRIP.AUTO-MCNC: NEGATIVE MG/DL
BUN SERPL-MCNC: 25 MG/DL (ref 8–25)
CALCIUM SERPL-MCNC: 9.5 MG/DL (ref 8.5–10.4)
CHLORIDE SERPL-SCNC: 107 MMOL/L (ref 97–107)
CHOLEST SERPL-MCNC: 183 MG/DL (ref 133–200)
CHOLEST/HDLC SERPL: 3.5 {RATIO}
CO2 SERPL-SCNC: 21 MMOL/L (ref 24–31)
COLOR UR: ABNORMAL
CREAT SERPL-MCNC: 1.3 MG/DL (ref 0.4–1.6)
CREAT UR-MCNC: 181.8 MG/DL
EGFRCR SERPLBLD CKD-EPI 2021: 41 ML/MIN/1.73M*2
EOSINOPHIL # BLD AUTO: 0.29 X10*3/UL (ref 0–0.4)
EOSINOPHIL NFR BLD AUTO: 2.8 %
ERYTHROCYTE [DISTWIDTH] IN BLOOD BY AUTOMATED COUNT: 13.1 % (ref 11.5–14.5)
EST. AVERAGE GLUCOSE BLD GHB EST-MCNC: 163 MG/DL
GLUCOSE SERPL-MCNC: 183 MG/DL (ref 65–99)
GLUCOSE UR STRIP.AUTO-MCNC: NORMAL MG/DL
HBA1C MFR BLD: 7.3 %
HCT VFR BLD AUTO: 37.3 % (ref 36–46)
HDLC SERPL-MCNC: 53 MG/DL
HGB BLD-MCNC: 12.1 G/DL (ref 12–16)
IMM GRANULOCYTES # BLD AUTO: 0.06 X10*3/UL (ref 0–0.5)
IMM GRANULOCYTES NFR BLD AUTO: 0.6 % (ref 0–0.9)
KETONES UR STRIP.AUTO-MCNC: NEGATIVE MG/DL
LDLC SERPL CALC-MCNC: 72 MG/DL (ref 65–130)
LEUKOCYTE ESTERASE UR QL STRIP.AUTO: ABNORMAL
LYMPHOCYTES # BLD AUTO: 1.99 X10*3/UL (ref 0.8–3)
LYMPHOCYTES NFR BLD AUTO: 19.2 %
MCH RBC QN AUTO: 29.5 PG (ref 26–34)
MCHC RBC AUTO-ENTMCNC: 32.4 G/DL (ref 32–36)
MCV RBC AUTO: 91 FL (ref 80–100)
MICROALBUMIN UR-MCNC: 329 MG/L (ref 0–23)
MICROALBUMIN/CREAT UR: 181 UG/MG CREAT
MONOCYTES # BLD AUTO: 0.76 X10*3/UL (ref 0.05–0.8)
MONOCYTES NFR BLD AUTO: 7.4 %
MUCOUS THREADS #/AREA URNS AUTO: ABNORMAL /LPF
NEUTROPHILS # BLD AUTO: 7.15 X10*3/UL (ref 1.6–5.5)
NEUTROPHILS NFR BLD AUTO: 69.1 %
NITRITE UR QL STRIP.AUTO: NEGATIVE
NRBC BLD-RTO: 0 /100 WBCS (ref 0–0)
PH UR STRIP.AUTO: 5.5 [PH]
PLATELET # BLD AUTO: 228 X10*3/UL (ref 150–450)
POTASSIUM SERPL-SCNC: 4.4 MMOL/L (ref 3.4–5.1)
PROT SERPL-MCNC: 7.1 G/DL (ref 5.9–7.9)
PROT UR STRIP.AUTO-MCNC: ABNORMAL MG/DL
RBC # BLD AUTO: 4.1 X10*6/UL (ref 4–5.2)
RBC # UR STRIP.AUTO: NEGATIVE /UL
RBC #/AREA URNS AUTO: ABNORMAL /HPF
SODIUM SERPL-SCNC: 141 MMOL/L (ref 133–145)
SP GR UR STRIP.AUTO: 1.02
SQUAMOUS #/AREA URNS AUTO: ABNORMAL /HPF
TRIGL SERPL-MCNC: 289 MG/DL (ref 40–150)
TSH SERPL DL<=0.05 MIU/L-ACNC: 3.07 MIU/L (ref 0.27–4.2)
UROBILINOGEN UR STRIP.AUTO-MCNC: NORMAL MG/DL
WBC # BLD AUTO: 10.3 X10*3/UL (ref 4.4–11.3)
WBC #/AREA URNS AUTO: ABNORMAL /HPF

## 2024-02-21 PROCEDURE — 82248 BILIRUBIN DIRECT: CPT

## 2024-02-21 PROCEDURE — 85025 COMPLETE CBC W/AUTO DIFF WBC: CPT

## 2024-02-21 PROCEDURE — 82570 ASSAY OF URINE CREATININE: CPT

## 2024-02-21 PROCEDURE — 99214 OFFICE O/P EST MOD 30 MIN: CPT | Performed by: STUDENT IN AN ORGANIZED HEALTH CARE EDUCATION/TRAINING PROGRAM

## 2024-02-21 PROCEDURE — 83036 HEMOGLOBIN GLYCOSYLATED A1C: CPT

## 2024-02-21 PROCEDURE — 3079F DIAST BP 80-89 MM HG: CPT | Performed by: STUDENT IN AN ORGANIZED HEALTH CARE EDUCATION/TRAINING PROGRAM

## 2024-02-21 PROCEDURE — 80061 LIPID PANEL: CPT

## 2024-02-21 PROCEDURE — 80053 COMPREHEN METABOLIC PANEL: CPT

## 2024-02-21 PROCEDURE — 82306 VITAMIN D 25 HYDROXY: CPT

## 2024-02-21 PROCEDURE — 81001 URINALYSIS AUTO W/SCOPE: CPT

## 2024-02-21 PROCEDURE — 1159F MED LIST DOCD IN RCRD: CPT | Performed by: STUDENT IN AN ORGANIZED HEALTH CARE EDUCATION/TRAINING PROGRAM

## 2024-02-21 PROCEDURE — 82043 UR ALBUMIN QUANTITATIVE: CPT

## 2024-02-21 PROCEDURE — 87086 URINE CULTURE/COLONY COUNT: CPT

## 2024-02-21 PROCEDURE — 95251 CONT GLUC MNTR ANALYSIS I&R: CPT | Performed by: STUDENT IN AN ORGANIZED HEALTH CARE EDUCATION/TRAINING PROGRAM

## 2024-02-21 PROCEDURE — 36415 COLL VENOUS BLD VENIPUNCTURE: CPT

## 2024-02-21 PROCEDURE — 1125F AMNT PAIN NOTED PAIN PRSNT: CPT | Performed by: STUDENT IN AN ORGANIZED HEALTH CARE EDUCATION/TRAINING PROGRAM

## 2024-02-21 PROCEDURE — 3075F SYST BP GE 130 - 139MM HG: CPT | Performed by: STUDENT IN AN ORGANIZED HEALTH CARE EDUCATION/TRAINING PROGRAM

## 2024-02-21 PROCEDURE — 1160F RVW MEDS BY RX/DR IN RCRD: CPT | Performed by: STUDENT IN AN ORGANIZED HEALTH CARE EDUCATION/TRAINING PROGRAM

## 2024-02-21 PROCEDURE — 84443 ASSAY THYROID STIM HORMONE: CPT

## 2024-02-21 PROCEDURE — 1036F TOBACCO NON-USER: CPT | Performed by: STUDENT IN AN ORGANIZED HEALTH CARE EDUCATION/TRAINING PROGRAM

## 2024-02-21 RX ORDER — ASPIRIN 81 MG/1
81 TABLET ORAL DAILY
Start: 2024-02-21 | End: 2024-04-04 | Stop reason: SDUPTHER

## 2024-02-21 RX ORDER — LOVASTATIN 40 MG/1
80 TABLET ORAL DAILY
Start: 2024-02-21 | End: 2024-02-29 | Stop reason: SDUPTHER

## 2024-02-21 RX ORDER — PREDNISONE 5 MG/1
5 TABLET ORAL DAILY
Qty: 30 TABLET | Refills: 2 | Status: SHIPPED | OUTPATIENT
Start: 2024-02-21 | End: 2024-04-04 | Stop reason: SDUPTHER

## 2024-02-21 RX ORDER — METFORMIN HYDROCHLORIDE 500 MG/1
TABLET, EXTENDED RELEASE ORAL
Start: 2024-02-21 | End: 2024-02-29 | Stop reason: SDUPTHER

## 2024-02-21 RX ORDER — METOPROLOL SUCCINATE 50 MG/1
50 TABLET, EXTENDED RELEASE ORAL DAILY
Qty: 90 TABLET | Refills: 3 | Status: SHIPPED | OUTPATIENT
Start: 2024-02-21 | End: 2024-04-04 | Stop reason: SDUPTHER

## 2024-02-21 RX ORDER — LISINOPRIL 5 MG/1
5 TABLET ORAL DAILY
Start: 2024-02-21 | End: 2024-02-29 | Stop reason: SDUPTHER

## 2024-02-21 RX ORDER — MULTIVITAMIN
1 TABLET ORAL DAILY
Start: 2024-02-21 | End: 2024-04-04 | Stop reason: SDUPTHER

## 2024-02-21 RX ORDER — SERTRALINE HYDROCHLORIDE 100 MG/1
100 TABLET, FILM COATED ORAL DAILY
Start: 2024-02-21 | End: 2024-02-29 | Stop reason: SDUPTHER

## 2024-02-21 RX ORDER — INSULIN GLARGINE 100 [IU]/ML
INJECTION, SOLUTION SUBCUTANEOUS
Start: 2024-02-21 | End: 2024-04-04 | Stop reason: SDUPTHER

## 2024-02-21 ASSESSMENT — LIFESTYLE VARIABLES
HOW OFTEN DO YOU HAVE SIX OR MORE DRINKS ON ONE OCCASION: NEVER
HAVE YOU OR SOMEONE ELSE BEEN INJURED AS A RESULT OF YOUR DRINKING: NO
HOW OFTEN DURING THE LAST YEAR HAVE YOU HAD A FEELING OF GUILT OR REMORSE AFTER DRINKING: NEVER
HOW OFTEN DURING THE LAST YEAR HAVE YOU BEEN UNABLE TO REMEMBER WHAT HAPPENED THE NIGHT BEFORE BECAUSE YOU HAD BEEN DRINKING: NEVER
HOW MANY STANDARD DRINKS CONTAINING ALCOHOL DO YOU HAVE ON A TYPICAL DAY: PATIENT DOES NOT DRINK
HOW OFTEN DURING THE LAST YEAR HAVE YOU NEEDED AN ALCOHOLIC DRINK FIRST THING IN THE MORNING TO GET YOURSELF GOING AFTER A NIGHT OF HEAVY DRINKING: NEVER
AUDIT TOTAL SCORE: 0
HOW OFTEN DO YOU HAVE A DRINK CONTAINING ALCOHOL: NEVER
AUDIT-C TOTAL SCORE: 0
HAS A RELATIVE, FRIEND, DOCTOR, OR ANOTHER HEALTH PROFESSIONAL EXPRESSED CONCERN ABOUT YOUR DRINKING OR SUGGESTED YOU CUT DOWN: NO
HOW OFTEN DURING THE LAST YEAR HAVE YOU FOUND THAT YOU WERE NOT ABLE TO STOP DRINKING ONCE YOU HAD STARTED: NEVER
SKIP TO QUESTIONS 9-10: 1
HOW OFTEN DURING THE LAST YEAR HAVE YOU FAILED TO DO WHAT WAS NORMALLY EXPECTED FROM YOU BECAUSE OF DRINKING: NEVER

## 2024-02-21 ASSESSMENT — ENCOUNTER SYMPTOMS
LOSS OF SENSATION IN FEET: 0
CARDIOVASCULAR NEGATIVE: 1
RESPIRATORY NEGATIVE: 1
CONSTITUTIONAL NEGATIVE: 1
GASTROINTESTINAL NEGATIVE: 1
OCCASIONAL FEELINGS OF UNSTEADINESS: 1
DEPRESSION: 0

## 2024-02-21 ASSESSMENT — PAIN SCALES - GENERAL: PAINLEVEL: 4

## 2024-02-21 ASSESSMENT — PATIENT HEALTH QUESTIONNAIRE - PHQ9
SUM OF ALL RESPONSES TO PHQ9 QUESTIONS 1 AND 2: 0
1. LITTLE INTEREST OR PLEASURE IN DOING THINGS: NOT AT ALL
2. FEELING DOWN, DEPRESSED OR HOPELESS: NOT AT ALL

## 2024-02-21 NOTE — PATIENT INSTRUCTIONS
Diagnoses and all orders for this visit:  Type 2 diabetes mellitus with other specified complication, with long-term current use of insulin (CMS/HCC)  -     Follow Up In Primary Care  -     insulin glargine (Lantus Solostar U-100 Insulin) 100 unit/mL (3 mL) pen; Inject 52 Units under the skin once daily with breakfast AND 30 Units once daily in the evening. Take with meals.  -     metFORMIN XR (Glucophage-XR) 500 mg 24 hr tablet; Take 2 tablets (1,000 mg) by mouth once daily with breakfast AND 1 tablet (500 mg) once daily in the evening. Take with meals.  -     multivitamin tablet; Take 1 tablet by mouth once daily. One-A-Day Women's 50+ Complete Multivitamin  -     Hemoglobin A1C; Future  -     TSH with reflex to Free T4 if abnormal; Future  -     Albumin , Urine Random; Future  Polyneuropathy associated with underlying disease (CMS/[facility])  -     multivitamin tablet; Take 1 tablet by mouth once daily. One-A-Day Women's 50+ Complete Multivitamin  Depression, unspecified depression type  -     sertraline (Zoloft) 100 mg tablet; Take 1 tablet (100 mg) by mouth once daily.  -     multivitamin tablet; Take 1 tablet by mouth once daily. One-A-Day Women's 50+ Complete Multivitamin  Temporal arteritis (CMS/HCC)  -     aspirin 81 mg EC tablet; Take 1 tablet (81 mg) by mouth once daily.  -     predniSONE (Deltasone) 5 mg tablet; Take 1 tablet (5 mg) by mouth once daily.  -     CBC and Auto Differential; Future  Essential hypertension  -     lisinopril 5 mg tablet; Take 1 tablet (5 mg) by mouth once daily.  -     metoprolol succinate XL (Toprol-XL) 50 mg 24 hr tablet; Take 1 tablet (50 mg) by mouth once daily.  -     Basic Metabolic Panel; Future  Mixed hyperlipidemia  -     lovastatin (Mevacor) 40 mg tablet; Take 2 tablets (80 mg) by mouth once daily.  -     Hepatic Function Panel; Future  -     Lipid Panel; Future  Vitamin D deficiency  -     multivitamin tablet; Take 1 tablet by mouth once daily. One-A-Day Women's 50+  Complete Multivitamin  -     Vitamin D 25-Hydroxy,Total (for eval of Vitamin D levels); Future  Anxiety  -     sertraline (Zoloft) 100 mg tablet; Take 1 tablet (100 mg) by mouth once daily.  -     multivitamin tablet; Take 1 tablet by mouth once daily. One-A-Day Women's 50+ Complete Multivitamin  Encounter for routine laboratory testing  -     Follow Up In Primary Care; Future  -     CBC and Auto Differential; Future  -     Basic Metabolic Panel; Future  -     Hepatic Function Panel; Future  -     Lipid Panel; Future  -     Hemoglobin A1C; Future  -     Vitamin D 25-Hydroxy,Total (for eval of Vitamin D levels); Future  -     TSH with reflex to Free T4 if abnormal; Future  -     Albumin , Urine Random; Future    - Significant medication and problem list reconciliation done today.  - Patient notes that she started getting worsening headaches. Was concerned that this was secondary to her temporal arteritis. Increased her prednisone from 2.5mg/day to 5.0mg/day. She was unsure if this was correct at first; however, her headaches have since resolved. I think that it is fine to continue this dosing for now. Patient to establish with a new rheumatologist in Spring 2024. Further decisions can be made about this at that time.  - Patient still noting issues with hypoglycemia. Her CGM does not really show it. Will back down on the overnight lantus a little more for her and mildly increase the daytime insulin to compensate for this.   - Do not need to make any other changes at this time.

## 2024-02-21 NOTE — PROGRESS NOTES
[facility]: [facility]  PROGRESS NOTE      Sheila Sanchez is a 83 y.o. female that is presenting today for Follow-up.    Assessment/Plan   Diagnoses and all orders for this visit:  Type 2 diabetes mellitus with other specified complication, with long-term current use of insulin (CMS/[facility])  -     Follow Up In Primary Care  -     insulin glargine (Lantus Solostar U-100 Insulin) 100 unit/mL (3 mL) pen; Inject 52 Units under the skin once daily with breakfast AND 30 Units once daily in the evening. Take with meals.  -     metFORMIN XR (Glucophage-XR) 500 mg 24 hr tablet; Take 2 tablets (1,000 mg) by mouth once daily with breakfast AND 1 tablet (500 mg) once daily in the evening. Take with meals.  -     multivitamin tablet; Take 1 tablet by mouth once daily. One-A-Day Women's 50+ Complete Multivitamin  -     Hemoglobin A1C; Future  -     TSH with reflex to Free T4 if abnormal; Future  -     Albumin , Urine Random; Future  Polyneuropathy associated with underlying disease (CMS/[facility])  -     multivitamin tablet; Take 1 tablet by mouth once daily. One-A-Day Women's 50+ Complete Multivitamin  Depression, unspecified depression type  -     sertraline (Zoloft) 100 mg tablet; Take 1 tablet (100 mg) by mouth once daily.  -     multivitamin tablet; Take 1 tablet by mouth once daily. One-A-Day Women's 50+ Complete Multivitamin  Temporal arteritis (CMS/[facility])  -     aspirin 81 mg EC tablet; Take 1 tablet (81 mg) by mouth once daily.  -     predniSONE (Deltasone) 5 mg tablet; Take 1 tablet (5 mg) by mouth once daily.  -     CBC and Auto Differential; Future  Essential hypertension  -     lisinopril 5 mg tablet; Take 1 tablet (5 mg) by mouth once daily.  -     metoprolol succinate XL (Toprol-XL) 50 mg 24 hr tablet; Take 1 tablet (50 mg) by mouth once daily.  -     Basic Metabolic Panel; Future  Mixed hyperlipidemia  -     lovastatin (Mevacor) 40 mg tablet; Take 2 tablets (80 mg) by mouth once daily.  -      Hepatic Function Panel; Future  -     Lipid Panel; Future  Vitamin D deficiency  -     multivitamin tablet; Take 1 tablet by mouth once daily. One-A-Day Women's 50+ Complete Multivitamin  -     Vitamin D 25-Hydroxy,Total (for eval of Vitamin D levels); Future  Anxiety  -     sertraline (Zoloft) 100 mg tablet; Take 1 tablet (100 mg) by mouth once daily.  -     multivitamin tablet; Take 1 tablet by mouth once daily. One-A-Day Women's 50+ Complete Multivitamin  Encounter for routine laboratory testing  -     Follow Up In Primary Care; Future  -     CBC and Auto Differential; Future  -     Basic Metabolic Panel; Future  -     Hepatic Function Panel; Future  -     Lipid Panel; Future  -     Hemoglobin A1C; Future  -     Vitamin D 25-Hydroxy,Total (for eval of Vitamin D levels); Future  -     TSH with reflex to Free T4 if abnormal; Future  -     Albumin , Urine Random; Future    - Significant medication and problem list reconciliation done today.  - Patient notes that she started getting worsening headaches. Was concerned that this was secondary to her temporal arteritis. Increased her prednisone from 2.5mg/day to 5.0mg/day. She was unsure if this was correct at first; however, her headaches have since resolved. I think that it is fine to continue this dosing for now. Patient to establish with a new rheumatologist in Spring 2024. Further decisions can be made about this at that time.  - Patient still noting issues with hypoglycemia. Her CGM does not really show it. Will back down on the overnight lantus a little more for her and mildly increase the daytime insulin to compensate for this.   - Do not need to make any other changes at this time.    Subjective   - The patient otherwise feels well and denies any acute symptoms or concerns at this time.  - The patient denies any changes or progression of their chronic medical problems.  - The patient denies any problems or concerns with their medications.      Review of Systems  "  Constitutional: Negative.    Respiratory: Negative.     Cardiovascular: Negative.    Gastrointestinal: Negative.       Objective   Vitals:    02/21/24 1003   BP: 130/80   Pulse: 79   Temp: 35.6 °C (96.1 °F)   SpO2: 97%      Body mass index is 27.54 kg/m².  Physical Exam  Constitutional:       General: She is not in acute distress.  Neck:      Vascular: No carotid bruit.   Cardiovascular:      Rate and Rhythm: Normal rate and regular rhythm.      Heart sounds: Normal heart sounds.   Pulmonary:      Effort: Pulmonary effort is normal.      Breath sounds: Normal breath sounds.   Musculoskeletal:         General: No swelling.   Neurological:      Mental Status: She is alert. Mental status is at baseline.   Psychiatric:         Mood and Affect: Mood normal.       Diagnostic Results   Lab Results   Component Value Date    GLUCOSE 184 (H) 02/24/2023    CALCIUM 9.6 02/24/2023     02/24/2023    K 4.1 02/24/2023    CO2 25 02/24/2023     02/24/2023    BUN 21 02/24/2023    CREATININE 0.9 02/24/2023     Lab Results   Component Value Date    ALT 15 02/24/2023    AST 19 02/24/2023    ALKPHOS 34 (L) 02/24/2023    BILITOT 0.5 02/24/2023     Lab Results   Component Value Date    WBC 7.6 02/24/2023    HGB 12.8 02/24/2023    HCT 38.8 02/24/2023    MCV 89.8 02/24/2023     02/24/2023     Lab Results   Component Value Date    CHOL 181 02/24/2023    CHOL 172 03/29/2022    CHOL 177 09/28/2021     Lab Results   Component Value Date    HDL 46 (L) 02/24/2023    HDL 47 (L) 03/29/2022    HDL 44 (L) 09/28/2021     Lab Results   Component Value Date    LDLCALC 71 02/24/2023    LDLCALC 73 03/29/2022    LDLCALC 77 09/28/2021     Lab Results   Component Value Date    TRIG 319 (H) 02/24/2023    TRIG 262 (H) 03/29/2022    TRIG 282 (H) 09/28/2021     No components found for: \"CHOLHDL\"  Lab Results   Component Value Date    HGBA1C 7.9 (H) 03/29/2022     Other labs not included in the list above were reviewed either before or during " this encounter.    History    History reviewed. No pertinent past medical history.  History reviewed. No pertinent surgical history.  No family history on file.  Social History     Socioeconomic History    Marital status:      Spouse name: Not on file    Number of children: Not on file    Years of education: Not on file    Highest education level: Not on file   Occupational History    Not on file   Tobacco Use    Smoking status: Never    Smokeless tobacco: Never   Substance and Sexual Activity    Alcohol use: Never    Drug use: Never    Sexual activity: Not on file   Other Topics Concern    Not on file   Social History Narrative    Not on file     Social Determinants of Health     Financial Resource Strain: Not on file   Food Insecurity: Not on file   Transportation Needs: Not on file   Physical Activity: Not on file   Stress: Not on file   Social Connections: Not on file   Intimate Partner Violence: Not on file   Housing Stability: Not on file     Allergies   Allergen Reactions    Alendronate Other     stomach upset    Ibandronate Other     stomach upset    Meperidine Other     stomach upset    Tetanus Toxoid Adsorbed Unknown     Current Outpatient Medications on File Prior to Visit   Medication Sig Dispense Refill    blood sugar diagnostic strip as directed every 12 hours for 90 days      flash glucose sensor kit (FreeStyle Júnior 2 Sensor) kit USE AS DIRECTED TO MONITOR BLOOD SUGAR AND CHANGE EVERY 14 DAYS 2 each 11    lidocaine (Lidoderm) 5 % patch 1 patch remove after 12 hours Externally Once a day prn      zolpidem (Ambien) 5 mg tablet Take 1 tablet (5 mg) by mouth as needed at bedtime for sleep. 90 tablet 1    [DISCONTINUED] aspirin 81 mg EC tablet 1 tablet Orally Once a day      [DISCONTINUED] blood sugar diagnostic strip USE AS DIRECTED EVERY 12 HOURS 200 each 3    [DISCONTINUED] flash glucose sensor kit (FreeStyle Júnior 2 Sensor) kit USE AS DIRECTED TO MONITOR BLOOD SUGAR AND CHANGE EVERY 14 DAYS 2  each 0    [DISCONTINUED] insulin glargine (Lantus Solostar U-100 Insulin) 100 unit/mL (3 mL) pen Inject 52 Units under the skin once daily in the morning AND 35 Units once daily at bedtime. (Patient taking differently: Inject 50 Units under the skin once daily in the morning AND 35 Units once daily at bedtime.) 15 mL 3    [DISCONTINUED] lisinopril 5 mg tablet Take 1 tablet (5 mg) by mouth once daily. 90 tablet 3    [DISCONTINUED] lovastatin (Mevacor) 40 mg tablet Take 2 tablets (80 mg) by mouth once daily. 180 tablet 3    [DISCONTINUED] metFORMIN XR (Glucophage-XR) 500 mg 24 hr tablet Take 2 tablets (1,000 mg) by mouth once daily with breakfast AND 1 tablet (500 mg) once daily in the evening. Take with meals. 270 tablet 3    [DISCONTINUED] metoprolol succinate XL (Toprol-XL) 50 mg 24 hr tablet Take 1 tablet (50 mg) by mouth once daily.      [DISCONTINUED] sertraline (Zoloft) 100 mg tablet Take 1 tablet (100 mg) by mouth once daily.      [DISCONTINUED] cholecalciferol (Vitamin D-3) 25 MCG (1000 UT) capsule Take 3 capsules (75 mcg) by mouth once daily. (Patient not taking: Reported on 2/21/2024)      [DISCONTINUED] metFORMIN XR (Glucophage-XR) 500 mg 24 hr tablet Take 1 tablet (500 mg) by mouth 2 times a day with meals.       No current facility-administered medications on file prior to visit.     Immunization History   Administered Date(s) Administered    DT (pediatric) 08/21/2023    Flu vaccine, quadrivalent, high-dose, preservative free, age 65y+ (FLUZONE) 09/19/2021, 10/05/2022    Influenza, High Dose Seasonal, Preservative Free 12/10/2019, 09/28/2020    Moderna COVID-19 vaccine, bivalent, blue cap/gray label *Check age/dose* 10/09/2022    Moderna SARS-CoV-2 Vaccination 01/22/2021, 02/19/2021, 11/13/2021    Pneumococcal conjugate vaccine, 13-valent (PREVNAR 13) 12/05/2018    Pneumococcal polysaccharide vaccine, 23-valent, age 2 years and older (PNEUMOVAX 23) 10/14/2004, 08/01/2012, 09/28/2020    Tdap vaccine, age  7 year and older (BOOSTRIX, ADACEL) 09/28/2020    Zoster vaccine, recombinant, adult (SHINGRIX) 03/02/2021    Zoster, live 02/01/2013     Patient's medical history was reviewed and updated either before or during this encounter.       Rg Traore MD

## 2024-02-22 LAB — BACTERIA UR CULT: NORMAL

## 2024-02-23 ENCOUNTER — PHARMACY VISIT (OUTPATIENT)
Dept: PHARMACY | Facility: CLINIC | Age: 84
End: 2024-02-23
Payer: COMMERCIAL

## 2024-02-23 PROCEDURE — RXMED WILLOW AMBULATORY MEDICATION CHARGE

## 2024-02-29 ENCOUNTER — TELEPHONE (OUTPATIENT)
Dept: PRIMARY CARE | Facility: CLINIC | Age: 84
End: 2024-02-29
Payer: MEDICARE

## 2024-02-29 DIAGNOSIS — E78.2 MIXED HYPERLIPIDEMIA: ICD-10-CM

## 2024-02-29 DIAGNOSIS — I10 ESSENTIAL HYPERTENSION: ICD-10-CM

## 2024-02-29 DIAGNOSIS — E11.69 TYPE 2 DIABETES MELLITUS WITH OTHER SPECIFIED COMPLICATION, WITH LONG-TERM CURRENT USE OF INSULIN (MULTI): ICD-10-CM

## 2024-02-29 DIAGNOSIS — Z79.4 TYPE 2 DIABETES MELLITUS WITH OTHER SPECIFIED COMPLICATION, WITH LONG-TERM CURRENT USE OF INSULIN (MULTI): ICD-10-CM

## 2024-02-29 DIAGNOSIS — F41.9 ANXIETY: Primary | ICD-10-CM

## 2024-02-29 PROCEDURE — RXMED WILLOW AMBULATORY MEDICATION CHARGE

## 2024-02-29 RX ORDER — LISINOPRIL 5 MG/1
5 TABLET ORAL DAILY
Qty: 90 TABLET | Refills: 3 | Status: SHIPPED | OUTPATIENT
Start: 2024-02-29 | End: 2024-02-29 | Stop reason: SDUPTHER

## 2024-02-29 RX ORDER — LISINOPRIL 5 MG/1
5 TABLET ORAL DAILY
Qty: 90 TABLET | Refills: 3 | Status: SHIPPED | OUTPATIENT
Start: 2024-02-29 | End: 2024-04-04 | Stop reason: SDUPTHER

## 2024-02-29 RX ORDER — METFORMIN HYDROCHLORIDE 500 MG/1
TABLET, EXTENDED RELEASE ORAL
Qty: 270 TABLET | Refills: 3 | Status: SHIPPED | OUTPATIENT
Start: 2024-02-29 | End: 2024-04-04 | Stop reason: SDUPTHER

## 2024-02-29 RX ORDER — METFORMIN HYDROCHLORIDE 500 MG/1
TABLET, EXTENDED RELEASE ORAL
Qty: 270 TABLET | Refills: 3 | Status: SHIPPED | OUTPATIENT
Start: 2024-02-29 | End: 2024-02-29 | Stop reason: SDUPTHER

## 2024-02-29 RX ORDER — SERTRALINE HYDROCHLORIDE 100 MG/1
100 TABLET, FILM COATED ORAL DAILY
Qty: 90 TABLET | Refills: 3 | Status: SHIPPED | OUTPATIENT
Start: 2024-02-29 | End: 2024-04-04 | Stop reason: SDUPTHER

## 2024-02-29 RX ORDER — LOVASTATIN 40 MG/1
80 TABLET ORAL DAILY
Qty: 180 TABLET | Refills: 3 | Status: SHIPPED | OUTPATIENT
Start: 2024-02-29 | End: 2024-02-29 | Stop reason: SDUPTHER

## 2024-02-29 RX ORDER — LOVASTATIN 40 MG/1
80 TABLET ORAL DAILY
Qty: 180 TABLET | Refills: 3 | Status: SHIPPED | OUTPATIENT
Start: 2024-02-29 | End: 2024-04-04 | Stop reason: SDUPTHER

## 2024-03-01 ENCOUNTER — PHARMACY VISIT (OUTPATIENT)
Dept: PHARMACY | Facility: CLINIC | Age: 84
End: 2024-03-01
Payer: COMMERCIAL

## 2024-03-01 DIAGNOSIS — E11.69 TYPE 2 DIABETES MELLITUS WITH OTHER SPECIFIED COMPLICATION, WITH LONG-TERM CURRENT USE OF INSULIN (MULTI): ICD-10-CM

## 2024-03-01 DIAGNOSIS — Z79.4 TYPE 2 DIABETES MELLITUS WITH OTHER SPECIFIED COMPLICATION, WITH LONG-TERM CURRENT USE OF INSULIN (MULTI): ICD-10-CM

## 2024-03-01 PROCEDURE — RXMED WILLOW AMBULATORY MEDICATION CHARGE

## 2024-03-01 RX ORDER — INSULIN GLARGINE 100 [IU]/ML
INJECTION, SOLUTION SUBCUTANEOUS
Qty: 15 ML | Refills: 3 | Status: SHIPPED | OUTPATIENT
Start: 2024-03-01 | End: 2024-04-04 | Stop reason: SDUPTHER

## 2024-03-06 ENCOUNTER — PHARMACY VISIT (OUTPATIENT)
Dept: PHARMACY | Facility: CLINIC | Age: 84
End: 2024-03-06
Payer: COMMERCIAL

## 2024-03-15 PROBLEM — E78.5 HLD (HYPERLIPIDEMIA): Status: ACTIVE | Noted: 2023-08-31

## 2024-03-21 PROCEDURE — RXMED WILLOW AMBULATORY MEDICATION CHARGE

## 2024-03-22 ENCOUNTER — PHARMACY VISIT (OUTPATIENT)
Dept: PHARMACY | Facility: CLINIC | Age: 84
End: 2024-03-22
Payer: COMMERCIAL

## 2024-03-22 PROCEDURE — RXOTC WILLOW AMBULATORY OTC CHARGE

## 2024-03-22 PROCEDURE — RXMED WILLOW AMBULATORY MEDICATION CHARGE

## 2024-03-26 ENCOUNTER — OFFICE VISIT (OUTPATIENT)
Dept: PRIMARY CARE | Facility: CLINIC | Age: 84
End: 2024-03-26
Payer: MEDICARE

## 2024-03-26 VITALS
DIASTOLIC BLOOD PRESSURE: 80 MMHG | WEIGHT: 142 LBS | OXYGEN SATURATION: 95 % | BODY MASS INDEX: 27.88 KG/M2 | SYSTOLIC BLOOD PRESSURE: 116 MMHG | TEMPERATURE: 96.3 F | HEART RATE: 81 BPM | HEIGHT: 60 IN

## 2024-03-26 DIAGNOSIS — K57.92 ACUTE DIVERTICULITIS: Primary | ICD-10-CM

## 2024-03-26 PROCEDURE — 1159F MED LIST DOCD IN RCRD: CPT | Performed by: NURSE PRACTITIONER

## 2024-03-26 PROCEDURE — 1036F TOBACCO NON-USER: CPT | Performed by: NURSE PRACTITIONER

## 2024-03-26 PROCEDURE — 1125F AMNT PAIN NOTED PAIN PRSNT: CPT | Performed by: NURSE PRACTITIONER

## 2024-03-26 PROCEDURE — 99213 OFFICE O/P EST LOW 20 MIN: CPT | Performed by: NURSE PRACTITIONER

## 2024-03-26 PROCEDURE — 1160F RVW MEDS BY RX/DR IN RCRD: CPT | Performed by: NURSE PRACTITIONER

## 2024-03-26 PROCEDURE — 3074F SYST BP LT 130 MM HG: CPT | Performed by: NURSE PRACTITIONER

## 2024-03-26 PROCEDURE — 3079F DIAST BP 80-89 MM HG: CPT | Performed by: NURSE PRACTITIONER

## 2024-03-26 RX ORDER — CIPROFLOXACIN 500 MG/1
500 TABLET ORAL 2 TIMES DAILY
Qty: 20 TABLET | Refills: 0 | Status: SHIPPED | OUTPATIENT
Start: 2024-03-26 | End: 2024-04-04 | Stop reason: ALTCHOICE

## 2024-03-26 RX ORDER — METRONIDAZOLE 500 MG/1
500 TABLET ORAL 3 TIMES DAILY
Qty: 30 TABLET | Refills: 0 | Status: SHIPPED | OUTPATIENT
Start: 2024-03-26 | End: 2024-04-04 | Stop reason: ALTCHOICE

## 2024-03-26 ASSESSMENT — ENCOUNTER SYMPTOMS
LOSS OF SENSATION IN FEET: 0
PALPITATIONS: 0
DIARRHEA: 0
ABDOMINAL PAIN: 1
DEPRESSION: 0
VOMITING: 0
CONSTIPATION: 0
FEVER: 0
OCCASIONAL FEELINGS OF UNSTEADINESS: 0
DYSURIA: 0
BLOOD IN STOOL: 0
NAUSEA: 1
HEMATURIA: 0
DIAPHORESIS: 0
CHILLS: 0
FATIGUE: 1
FLANK PAIN: 0

## 2024-03-26 ASSESSMENT — LIFESTYLE VARIABLES
HOW OFTEN DURING THE LAST YEAR HAVE YOU FOUND THAT YOU WERE NOT ABLE TO STOP DRINKING ONCE YOU HAD STARTED: NEVER
HAVE YOU OR SOMEONE ELSE BEEN INJURED AS A RESULT OF YOUR DRINKING: NO
HOW OFTEN DO YOU HAVE A DRINK CONTAINING ALCOHOL: NEVER
SKIP TO QUESTIONS 9-10: 1
HOW MANY STANDARD DRINKS CONTAINING ALCOHOL DO YOU HAVE ON A TYPICAL DAY: PATIENT DOES NOT DRINK
AUDIT TOTAL SCORE: 0
HOW OFTEN DURING THE LAST YEAR HAVE YOU FAILED TO DO WHAT WAS NORMALLY EXPECTED FROM YOU BECAUSE OF DRINKING: NEVER
AUDIT-C TOTAL SCORE: 0
HOW OFTEN DURING THE LAST YEAR HAVE YOU NEEDED AN ALCOHOLIC DRINK FIRST THING IN THE MORNING TO GET YOURSELF GOING AFTER A NIGHT OF HEAVY DRINKING: NEVER
HOW OFTEN DURING THE LAST YEAR HAVE YOU BEEN UNABLE TO REMEMBER WHAT HAPPENED THE NIGHT BEFORE BECAUSE YOU HAD BEEN DRINKING: NEVER
HAS A RELATIVE, FRIEND, DOCTOR, OR ANOTHER HEALTH PROFESSIONAL EXPRESSED CONCERN ABOUT YOUR DRINKING OR SUGGESTED YOU CUT DOWN: NO
HOW OFTEN DO YOU HAVE SIX OR MORE DRINKS ON ONE OCCASION: NEVER
HOW OFTEN DURING THE LAST YEAR HAVE YOU HAD A FEELING OF GUILT OR REMORSE AFTER DRINKING: NEVER

## 2024-03-26 ASSESSMENT — PATIENT HEALTH QUESTIONNAIRE - PHQ9
1. LITTLE INTEREST OR PLEASURE IN DOING THINGS: NOT AT ALL
SUM OF ALL RESPONSES TO PHQ9 QUESTIONS 1 AND 2: 0
2. FEELING DOWN, DEPRESSED OR HOPELESS: NOT AT ALL

## 2024-03-26 ASSESSMENT — PAIN SCALES - GENERAL: PAINLEVEL: 7

## 2024-03-26 NOTE — PROGRESS NOTES
[facility]: [facility]  PROGRESS NOTE      Sheila Sanchez is a 83 y.o. female that is presenting today with c/o abdominal pain x 3 days.    Assessment/Plan   Diagnoses and all orders for this visit:    Acute diverticulitis  -     Patient with h/o diverticulitis. Reports same symptoms.  Patient declines CT Scan.  -     Start metroNIDAZOLE (Flagyl) 500 mg tablet; Take 1 tablet (500 mg) by mouth 3 times a day for 10 days.  -     Start ciprofloxacin (Cipro) 500 mg tablet; Take 1 tablet (500 mg) by mouth 2 times a day for 10 days.  -     Continue Tramadol 50 mg as needed for pain    Subjective   Subjective  Sheila Sanchez is a 83 y.o. female who presents for evaluation of abdominal pain. Onset was 3 days ago. Symptoms have been unchanged. The pain is described as dull, and is 6/10 in intensity. Pain is located in the LLQ without radiation.  Aggravating factors: none.  Alleviating factors: none. Associated symptoms: nausea. The patient denies chills, constipation, diarrhea, dysuria, fever, frequency, headache, hematochezia, hematuria, sweats, and vomiting.    Objective  [unfilled]     Assessment/Plan  Abdominal pain, likely secondary to diverticulitis  Adhere to simple, bland diet.  Start abx Cipro and Flagyl.  Follow up with PCP as scheduled on 04/04/24.        Review of Systems   Constitutional:  Positive for fatigue. Negative for chills, diaphoresis and fever.   Cardiovascular:  Negative for chest pain and palpitations.   Gastrointestinal:  Positive for abdominal pain (LLQ) and nausea. Negative for blood in stool, constipation, diarrhea and vomiting.   Genitourinary:  Negative for dysuria, flank pain, hematuria and urgency.      Objective   Vitals:    03/26/24 0927   BP: 116/80   Pulse: 81   Temp: 35.7 °C (96.3 °F)   SpO2: 95%      Body mass index is 27.73 kg/m².  Physical Exam  Constitutional:       General: She is in acute distress (appears uncomfortable).      Appearance: She is not  "ill-appearing.   Cardiovascular:      Rate and Rhythm: Normal rate and regular rhythm.      Heart sounds: Normal heart sounds. No murmur heard.  Pulmonary:      Effort: Pulmonary effort is normal.      Breath sounds: Normal breath sounds.   Abdominal:      General: There is no distension.      Palpations: Abdomen is soft. There is no mass.      Tenderness: There is abdominal tenderness (LLQ). There is no right CVA tenderness, left CVA tenderness, guarding or rebound.      Hernia: No hernia is present.   Skin:     General: Skin is warm and dry.   Neurological:      Mental Status: She is alert. Mental status is at baseline.   Psychiatric:         Mood and Affect: Mood normal.         Behavior: Behavior normal.       Diagnostic Results   Lab Results   Component Value Date    GLUCOSE 183 (H) 02/21/2024    CALCIUM 9.5 02/21/2024     02/21/2024    K 4.4 02/21/2024    CO2 21 (L) 02/21/2024     02/21/2024    BUN 25 02/21/2024    CREATININE 1.30 02/21/2024     Lab Results   Component Value Date    ALT 17 02/21/2024    AST 20 02/21/2024    ALKPHOS 60 02/21/2024    BILITOT 0.2 02/21/2024     Lab Results   Component Value Date    WBC 10.3 02/21/2024    HGB 12.1 02/21/2024    HCT 37.3 02/21/2024    MCV 91 02/21/2024     02/21/2024     Lab Results   Component Value Date    CHOL 183 02/21/2024    CHOL 181 02/24/2023    CHOL 172 03/29/2022     Lab Results   Component Value Date    HDL 53.0 02/21/2024    HDL 46 (L) 02/24/2023    HDL 47 (L) 03/29/2022     Lab Results   Component Value Date    LDLCALC 72 02/21/2024    LDLCALC 71 02/24/2023    LDLCALC 73 03/29/2022     Lab Results   Component Value Date    TRIG 289 (H) 02/21/2024    TRIG 319 (H) 02/24/2023    TRIG 262 (H) 03/29/2022     No components found for: \"CHOLHDL\"  Lab Results   Component Value Date    HGBA1C 7.3 (H) 02/21/2024     Other labs not included in the list above were reviewed either before or during this encounter.    History    History reviewed. No " pertinent past medical history.  History reviewed. No pertinent surgical history.  No family history on file.  Social History     Socioeconomic History    Marital status:      Spouse name: Not on file    Number of children: Not on file    Years of education: Not on file    Highest education level: Not on file   Occupational History    Not on file   Tobacco Use    Smoking status: Never    Smokeless tobacco: Never   Substance and Sexual Activity    Alcohol use: Never    Drug use: Never    Sexual activity: Not on file   Other Topics Concern    Not on file   Social History Narrative    Not on file     Social Determinants of Health     Financial Resource Strain: Not on file   Food Insecurity: Not on file   Transportation Needs: Not on file   Physical Activity: Not on file   Stress: Not on file   Social Connections: Not on file   Intimate Partner Violence: Not on file   Housing Stability: Not on file     Allergies   Allergen Reactions    Alendronate Other     stomach upset    Ibandronate Other     stomach upset    Meperidine Other     stomach upset    Tetanus Toxoid Adsorbed Unknown     Current Outpatient Medications on File Prior to Visit   Medication Sig Dispense Refill    aspirin 81 mg EC tablet Take 1 tablet (81 mg) by mouth once daily.      blood sugar diagnostic strip as directed every 12 hours for 90 days      flash glucose sensor kit (FreeStyle Júnior 2 Sensor) kit USE AS DIRECTED TO MONITOR BLOOD SUGAR AND CHANGE EVERY 14 DAYS 2 each 11    insulin glargine (Lantus Solostar U-100 Insulin) 100 unit/mL (3 mL) pen Inject 52 Units under the skin once daily with breakfast AND 30 Units once daily in the evening. Take with meals.      insulin glargine (Lantus Solostar U-100 Insulin) 100 unit/mL (3 mL) pen Inject 52 Units under the skin once daily in the morning AND 35 Units once daily at bedtime. (Patient taking differently: Inject 50 Units under the skin once daily in the morning AND 30 Units once daily at  bedtime.) 15 mL 3    lidocaine (Lidoderm) 5 % patch 1 patch remove after 12 hours Externally Once a day prn      lisinopril 5 mg tablet Take 1 tablet (5 mg) by mouth once daily. 90 tablet 3    lovastatin (Mevacor) 40 mg tablet Take 2 tablets (80 mg) by mouth once daily. 180 tablet 3    metFORMIN XR (Glucophage-XR) 500 mg 24 hr tablet Take 2 tablets (1,000 mg) by mouth once daily with breakfast AND 1 tablet (500 mg) once daily in the evening. Take with meals. 270 tablet 3    metoprolol succinate XL (Toprol-XL) 50 mg 24 hr tablet Take 1 tablet (50 mg) by mouth once daily. 90 tablet 3    multivitamin tablet Take 1 tablet by mouth once daily. One-A-Day Women's 50+ Complete Multivitamin      predniSONE (Deltasone) 5 mg tablet Take 1 tablet (5 mg) by mouth once daily. 30 tablet 2    sertraline (Zoloft) 100 mg tablet TAKE 1 TABLET BY MOUTH ONE TIME DAILY 90 tablet 3    traMADol (Ultram) 50 mg tablet Take 1 tablet by mouth twice daily as needed pain 14 tablet 0    zolpidem (Ambien) 5 mg tablet Take 1 tablet (5 mg) by mouth as needed at bedtime for sleep. 90 tablet 1     No current facility-administered medications on file prior to visit.     Immunization History   Administered Date(s) Administered    DT (pediatric) 08/21/2023    Flu vaccine, quadrivalent, high-dose, preservative free, age 65y+ (FLUZONE) 09/19/2021, 10/05/2022, 09/08/2023    Influenza, High Dose Seasonal, Preservative Free 12/10/2019, 09/28/2020    Moderna COVID-19 vaccine, bivalent, blue cap/gray label *Check age/dose* 10/09/2022    Moderna SARS-CoV-2 Vaccination 01/22/2021, 02/19/2021, 11/13/2021    Pneumococcal conjugate vaccine, 13-valent (PREVNAR 13) 12/05/2018    Pneumococcal polysaccharide vaccine, 23-valent, age 2 years and older (PNEUMOVAX 23) 10/14/2004, 08/01/2012, 09/28/2020    Tdap vaccine, age 7 year and older (BOOSTRIX, ADACEL) 09/28/2020    Zoster vaccine, recombinant, adult (SHINGRIX) 03/02/2021    Zoster, live 02/01/2013     Patient's  medical history was reviewed and updated either before or during this encounter.       Romana Vallecillo, UBALDO-CNP

## 2024-04-03 ASSESSMENT — ENCOUNTER SYMPTOMS
ALLERGIC/IMMUNOLOGIC NEGATIVE: 1
CARDIOVASCULAR NEGATIVE: 1
MUSCULOSKELETAL NEGATIVE: 1
CONSTITUTIONAL NEGATIVE: 1
GASTROINTESTINAL NEGATIVE: 1
PSYCHIATRIC NEGATIVE: 1
ENDOCRINE NEGATIVE: 1
RESPIRATORY NEGATIVE: 1
NEUROLOGICAL NEGATIVE: 1
EYES NEGATIVE: 1
HEMATOLOGIC/LYMPHATIC NEGATIVE: 1

## 2024-04-03 NOTE — PROGRESS NOTES
[facility]: [facility]  MEDICARE WELLNESS EXAM      Sheila Sanchez is a 83 y.o. female that is presenting today for Annual Exam.    Assessment/Plan    Diagnoses and all orders for this visit:  Annual physical exam  -     Follow Up In Primary Care; Future  -     Follow Up In Primary Care; Future  Counseling regarding advanced care planning and goals of care  Encounter for routine laboratory testing  -     CBC and Auto Differential; Future  -     Basic Metabolic Panel; Future  -     Hepatic Function Panel; Future  -     Hemoglobin A1C; Future  -     Albumin , Urine Random; Future  -     Opiate/Opioid/Benzo Prescription Compliance; Future  Encounter for immunization  Type 2 diabetes mellitus with other specified complication, with long-term current use of insulin (CMS/[facility])  -     insulin glargine (Lantus Solostar U-100 Insulin) 100 unit/mL (3 mL) pen; Inject 52 Units under the skin once daily with breakfast and 30 units once daily in the evening. Take with meals.  -     metFORMIN XR (Glucophage-XR) 500 mg 24 hr tablet; Take 2 tablets (1,000 mg) by mouth once daily with breakfast AND 1 tablet (500 mg) once daily in the evening. Take with meals.  -     multivitamin tablet; Take 1 tablet by mouth once daily. One-A-Day Women's 50+ Complete Multivitamin  -     Hemoglobin A1C; Future  -     Albumin , Urine Random; Future  Polyneuropathy associated with underlying disease (CMS/[facility])  -     multivitamin tablet; Take 1 tablet by mouth once daily. One-A-Day Women's 50+ Complete Multivitamin  -     gabapentin (Neurontin) 100 mg capsule; Take 1 capsule (100 mg) by mouth once daily at bedtime.  Primary osteoarthritis involving multiple joints  Stage 3b chronic kidney disease (CMS/[facility])  -     CBC and Auto Differential; Future  Depression, unspecified depression type  -     zolpidem (Ambien) 5 mg tablet; Take 1 tablet (5 mg) by mouth as needed at bedtime for sleep.  -     sertraline (Zoloft) 100 mg tablet;  Take 1 tablet (100 mg) by mouth once daily.  -     multivitamin tablet; Take 1 tablet by mouth once daily. One-A-Day Women's 50+ Complete Multivitamin  Temporal arteritis (CMS/HCC)  -     aspirin 81 mg EC tablet; Take 1 tablet (81 mg) by mouth once daily.  -     predniSONE (Deltasone) 5 mg tablet; Take 1 tablet (5 mg) by mouth once daily.  History of diverticulitis  -     Referral to Gastroenterology; Future  Mixed hyperlipidemia  -     lovastatin (Mevacor) 40 mg tablet; Take 2 tablets (80 mg) by mouth once daily.  Primary hypertension  -     metoprolol succinate XL (Toprol-XL) 50 mg 24 hr tablet; Take 1 tablet (50 mg) by mouth once daily.  -     lisinopril 5 mg tablet; Take 1 tablet (5 mg) by mouth once daily.  Vitamin D deficiency  -     multivitamin tablet; Take 1 tablet by mouth once daily. One-A-Day Women's 50+ Complete Multivitamin  -     cholecalciferol (Vitamin D3) 25 MCG (1000 UT) capsule; Take 1 capsule (25 mcg) by mouth once daily.  Polypharmacy  -     Opiate/Opioid/Benzo Prescription Compliance; Future  Diverticulosis  -     Referral to Gastroenterology; Future  Anxiety  -     zolpidem (Ambien) 5 mg tablet; Take 1 tablet (5 mg) by mouth as needed at bedtime for sleep.  -     sertraline (Zoloft) 100 mg tablet; Take 1 tablet (100 mg) by mouth once daily.  -     multivitamin tablet; Take 1 tablet by mouth once daily. One-A-Day Women's 50+ Complete Multivitamin    Discussed routine and/or preventative care with the patient as outlined below:  - Labwork:   - Patient had labwork done for this appointment. Discussed today.   - Kidney function stable.  - Sugars don't look terrible. On CGM review, the patient is having the same problem that she always has where she is spiking in the morning following her breakfast. Will make a very tiny adjustment to her morning insulin and not touch the evening dosing.   - Persistent Vitamin D deficiency. Increase supplementation.  - Will order labwork for the patient to get one  week prior to the Fall 2024 appointment.  - Imaging:   - Patient does not appear to be due for routine imaging at this time.  - Immunizations:   - Encouraged the patient to get their shingles (shingrix) immunizations.  - Discussed seasonal immunizations, including the influenza and COVID-19 immunizations.   - Encouraged the patient to confirm that Living Will and Healthcare Power of  (HCPoA) are accurate and up to date.   - Significant medication and problem list reconciliation done today. Discussed opiate // controlled substance use with the patient. Notable that the tramadol is being prescribed by pain management and not myself.  - Vitals stable. No changes at this time.  - Patient has been dealing with significant low back pain. There is concern for lumbar spinal stenosis. MRI tomorrow and will be evaluated by pain management next week. Patient noting that she has been dealing with severe bilateral lower extremity neuropathy / burning due to both this and her long-term diabetes. Will trial low-dose gabapentin; discussed with her at length that we back to be careful with this due to the fact that she already has tramadol and Ambien on her list AND she has significant kidney dysfunction.  - Patient has been having recurrent bouts of diverticulitis. Is requesting that I give her antibiotics that she can keep on-hand for when this flares up in the future. Discussed with her that the guidelines for treatment of outpatient diverticulitis have changed and that we are no longer supposed to give outpatient antibiotics. Will refer the patient to gastroenterology per request.  - Mental health stable. No changes at this time.    ADVANCED CARE PLANNING  Advanced Care Planning was discussed with patient:  The patient has an active advanced care plan on file. The patient has an active surrogate decision-maker on file.  Encouraged the patient to confirm that Living Will and Healthcare Power of  (HCPoA) are  accurate and up to date.  Encouraged the patient to confirm that our office be provided a copy of any documentation in the event that anything changes.    ACTIVITIES OF DAILY LIVING  Basic ADLs:  Bathing: Independent, Dressing: Independent, Toileting: Independent, Transferring: Independent, Continence: Independent, Feeding: Independent.    Instrumental ADLs:  Ability to use phone: Independent, Shopping: Independent, Cooking: Independent, House-keeping: Independent, Laundry: Independent, Transportation: Independent, Medication Management: Independent, Finance Management: Independent.    Subjective   - The patient otherwise feels well and denies any acute symptoms or concerns at this time.  - The patient denies any changes or progression of their chronic medical problems.  - The patient denies any problems or concerns with their medications.      Review of Systems   Constitutional: Negative.    HENT: Negative.     Eyes: Negative.    Respiratory: Negative.     Cardiovascular: Negative.    Gastrointestinal: Negative.    Endocrine: Negative.    Genitourinary: Negative.    Musculoskeletal: Negative.    Skin: Negative.    Allergic/Immunologic: Negative.    Neurological: Negative.    Hematological: Negative.    Psychiatric/Behavioral: Negative.     All other systems reviewed and are negative.    Objective   Vitals:    04/04/24 0809   BP: 124/70   Pulse: 90   Temp: 36.4 °C (97.6 °F)   SpO2: 96%      Body mass index is 28.32 kg/m².  Physical Exam  Vitals and nursing note reviewed.   Constitutional:       General: She is not in acute distress.     Appearance: Normal appearance. She is not ill-appearing.   HENT:      Head: Normocephalic and atraumatic.      Right Ear: Tympanic membrane, ear canal and external ear normal. There is no impacted cerumen.      Left Ear: Tympanic membrane, ear canal and external ear normal. There is no impacted cerumen.      Nose: Nose normal.      Mouth/Throat:      Mouth: Mucous membranes are  moist.      Pharynx: Oropharynx is clear. No oropharyngeal exudate or posterior oropharyngeal erythema.   Eyes:      General: No scleral icterus.        Right eye: No discharge.         Left eye: No discharge.      Extraocular Movements: Extraocular movements intact.      Conjunctiva/sclera: Conjunctivae normal.      Pupils: Pupils are equal, round, and reactive to light.   Neck:      Vascular: No carotid bruit.   Cardiovascular:      Rate and Rhythm: Normal rate and regular rhythm.      Pulses: Normal pulses.      Heart sounds: Normal heart sounds. No murmur heard.     No friction rub. No gallop.   Pulmonary:      Effort: Pulmonary effort is normal. No respiratory distress.      Breath sounds: Normal breath sounds.   Abdominal:      General: Abdomen is flat. Bowel sounds are normal. There is no distension.      Palpations: Abdomen is soft.      Tenderness: There is no abdominal tenderness.      Hernia: No hernia is present.   Musculoskeletal:         General: No swelling or tenderness. Normal range of motion.   Lymphadenopathy:      Cervical: No cervical adenopathy.   Skin:     General: Skin is warm and dry.      Capillary Refill: Capillary refill takes less than 2 seconds.      Coloration: Skin is not jaundiced.      Findings: No rash.   Neurological:      General: No focal deficit present.      Mental Status: She is alert and oriented to person, place, and time. Mental status is at baseline.   Psychiatric:         Mood and Affect: Mood normal.         Behavior: Behavior normal.       Diagnostic Results   Lab Results   Component Value Date    GLUCOSE 183 (H) 02/21/2024    CALCIUM 9.5 02/21/2024     02/21/2024    K 4.4 02/21/2024    CO2 21 (L) 02/21/2024     02/21/2024    BUN 25 02/21/2024    CREATININE 1.30 02/21/2024     Lab Results   Component Value Date    ALT 17 02/21/2024    AST 20 02/21/2024    ALKPHOS 60 02/21/2024    BILITOT 0.2 02/21/2024     Lab Results   Component Value Date    WBC 10.3  "02/21/2024    HGB 12.1 02/21/2024    HCT 37.3 02/21/2024    MCV 91 02/21/2024     02/21/2024     Lab Results   Component Value Date    CHOL 183 02/21/2024    CHOL 181 02/24/2023    CHOL 172 03/29/2022     Lab Results   Component Value Date    HDL 53.0 02/21/2024    HDL 46 (L) 02/24/2023    HDL 47 (L) 03/29/2022     Lab Results   Component Value Date    LDLCALC 72 02/21/2024    LDLCALC 71 02/24/2023    LDLCALC 73 03/29/2022     Lab Results   Component Value Date    TRIG 289 (H) 02/21/2024    TRIG 319 (H) 02/24/2023    TRIG 262 (H) 03/29/2022     No components found for: \"CHOLHDL\"  Lab Results   Component Value Date    HGBA1C 7.3 (H) 02/21/2024     Other labs not included in the list above reviewed either before or during this encounter.    History   No past medical history on file.  No past surgical history on file.  No family history on file.  Social History     Socioeconomic History    Marital status:      Spouse name: Not on file    Number of children: Not on file    Years of education: Not on file    Highest education level: Not on file   Occupational History    Not on file   Tobacco Use    Smoking status: Never    Smokeless tobacco: Never   Substance and Sexual Activity    Alcohol use: Never    Drug use: Never    Sexual activity: Not on file   Other Topics Concern    Not on file   Social History Narrative    Not on file     Social Determinants of Health     Financial Resource Strain: Not on file   Food Insecurity: Not on file   Transportation Needs: Not on file   Physical Activity: Not on file   Stress: Not on file   Social Connections: Not on file   Intimate Partner Violence: Not on file   Housing Stability: Not on file     Allergies   Allergen Reactions    Alendronate Other     stomach upset    Ibandronate Other     stomach upset    Meperidine Other     stomach upset    Tetanus Toxoid Adsorbed Unknown     Current Outpatient Medications on File Prior to Visit   Medication Sig Dispense Refill    " blood sugar diagnostic strip as directed every 12 hours for 90 days      flash glucose sensor kit (FreeStyle Júnior 2 Sensor) kit USE AS DIRECTED TO MONITOR BLOOD SUGAR AND CHANGE EVERY 14 DAYS 2 each 11    lidocaine (Lidoderm) 5 % patch 1 patch remove after 12 hours Externally Once a day prn      traMADol (Ultram) 50 mg tablet Take 1 tablet by mouth twice daily as needed pain 14 tablet 0    [DISCONTINUED] aspirin 81 mg EC tablet Take 1 tablet (81 mg) by mouth once daily.      [DISCONTINUED] insulin glargine (Lantus Solostar U-100 Insulin) 100 unit/mL (3 mL) pen Inject 52 Units under the skin once daily with breakfast AND 30 Units once daily in the evening. Take with meals.      [DISCONTINUED] insulin glargine (Lantus Solostar U-100 Insulin) 100 unit/mL (3 mL) pen Inject 52 Units under the skin once daily in the morning AND 35 Units once daily at bedtime. (Patient taking differently: Inject 50 Units under the skin once daily in the morning AND 30 Units once daily at bedtime.) 15 mL 3    [DISCONTINUED] lisinopril 5 mg tablet Take 1 tablet (5 mg) by mouth once daily. 90 tablet 3    [DISCONTINUED] lovastatin (Mevacor) 40 mg tablet Take 2 tablets (80 mg) by mouth once daily. 180 tablet 3    [DISCONTINUED] metFORMIN XR (Glucophage-XR) 500 mg 24 hr tablet Take 2 tablets (1,000 mg) by mouth once daily with breakfast AND 1 tablet (500 mg) once daily in the evening. Take with meals. 270 tablet 3    [DISCONTINUED] metoprolol succinate XL (Toprol-XL) 50 mg 24 hr tablet Take 1 tablet (50 mg) by mouth once daily. 90 tablet 3    [DISCONTINUED] multivitamin tablet Take 1 tablet by mouth once daily. One-A-Day Women's 50+ Complete Multivitamin      [DISCONTINUED] predniSONE (Deltasone) 5 mg tablet Take 1 tablet (5 mg) by mouth once daily. 30 tablet 2    [DISCONTINUED] sertraline (Zoloft) 100 mg tablet TAKE 1 TABLET BY MOUTH ONE TIME DAILY 90 tablet 3    [DISCONTINUED] zolpidem (Ambien) 5 mg tablet Take 1 tablet (5 mg) by mouth as  needed at bedtime for sleep. 90 tablet 1    [DISCONTINUED] ciprofloxacin (Cipro) 500 mg tablet Take 1 tablet (500 mg) by mouth 2 times a day for 10 days. (Patient not taking: Reported on 4/4/2024) 20 tablet 0    [DISCONTINUED] metroNIDAZOLE (Flagyl) 500 mg tablet Take 1 tablet (500 mg) by mouth 3 times a day for 10 days. (Patient not taking: Reported on 4/4/2024) 30 tablet 0     No current facility-administered medications on file prior to visit.     Immunization History   Administered Date(s) Administered    DT (pediatric) 08/21/2023    Flu vaccine, quadrivalent, high-dose, preservative free, age 65y+ (FLUZONE) 09/19/2021, 10/05/2022, 09/08/2023    Influenza, High Dose Seasonal, Preservative Free 12/10/2019, 09/28/2020    Moderna COVID-19 vaccine, bivalent, blue cap/gray label *Check age/dose* 10/09/2022    Moderna SARS-CoV-2 Vaccination 01/22/2021, 02/19/2021, 11/13/2021    Pneumococcal conjugate vaccine, 13-valent (PREVNAR 13) 12/05/2018    Pneumococcal polysaccharide vaccine, 23-valent, age 2 years and older (PNEUMOVAX 23) 10/14/2004, 08/01/2012, 09/28/2020    Tdap vaccine, age 7 year and older (BOOSTRIX, ADACEL) 09/28/2020    Zoster vaccine, recombinant, adult (SHINGRIX) 03/02/2021    Zoster, live 02/01/2013     Patient's medical history was reviewed and updated either before or during this encounter.     Rg Traore MD

## 2024-04-04 ENCOUNTER — OFFICE VISIT (OUTPATIENT)
Dept: PRIMARY CARE | Facility: CLINIC | Age: 84
End: 2024-04-04
Payer: MEDICARE

## 2024-04-04 VITALS
OXYGEN SATURATION: 96 % | SYSTOLIC BLOOD PRESSURE: 124 MMHG | WEIGHT: 145 LBS | HEART RATE: 90 BPM | TEMPERATURE: 97.6 F | DIASTOLIC BLOOD PRESSURE: 70 MMHG | BODY MASS INDEX: 28.47 KG/M2 | HEIGHT: 60 IN

## 2024-04-04 DIAGNOSIS — K57.90 DIVERTICULOSIS: ICD-10-CM

## 2024-04-04 DIAGNOSIS — Z23 ENCOUNTER FOR IMMUNIZATION: ICD-10-CM

## 2024-04-04 DIAGNOSIS — I10 PRIMARY HYPERTENSION: ICD-10-CM

## 2024-04-04 DIAGNOSIS — F41.9 ANXIETY: ICD-10-CM

## 2024-04-04 DIAGNOSIS — Z00.00 ANNUAL PHYSICAL EXAM: Primary | ICD-10-CM

## 2024-04-04 DIAGNOSIS — E55.9 VITAMIN D DEFICIENCY: ICD-10-CM

## 2024-04-04 DIAGNOSIS — F32.A DEPRESSION, UNSPECIFIED DEPRESSION TYPE: ICD-10-CM

## 2024-04-04 DIAGNOSIS — N18.32 STAGE 3B CHRONIC KIDNEY DISEASE (MULTI): ICD-10-CM

## 2024-04-04 DIAGNOSIS — Z79.4 TYPE 2 DIABETES MELLITUS WITH OTHER SPECIFIED COMPLICATION, WITH LONG-TERM CURRENT USE OF INSULIN (MULTI): ICD-10-CM

## 2024-04-04 DIAGNOSIS — Z01.89 ENCOUNTER FOR ROUTINE LABORATORY TESTING: ICD-10-CM

## 2024-04-04 DIAGNOSIS — G63 POLYNEUROPATHY ASSOCIATED WITH UNDERLYING DISEASE (MULTI): ICD-10-CM

## 2024-04-04 DIAGNOSIS — Z71.89 COUNSELING REGARDING ADVANCED CARE PLANNING AND GOALS OF CARE: ICD-10-CM

## 2024-04-04 DIAGNOSIS — E11.69 TYPE 2 DIABETES MELLITUS WITH OTHER SPECIFIED COMPLICATION, WITH LONG-TERM CURRENT USE OF INSULIN (MULTI): ICD-10-CM

## 2024-04-04 DIAGNOSIS — M31.6 TEMPORAL ARTERITIS (MULTI): ICD-10-CM

## 2024-04-04 DIAGNOSIS — M15.9 PRIMARY OSTEOARTHRITIS INVOLVING MULTIPLE JOINTS: ICD-10-CM

## 2024-04-04 DIAGNOSIS — Z87.19 HISTORY OF DIVERTICULITIS: ICD-10-CM

## 2024-04-04 DIAGNOSIS — Z79.899 POLYPHARMACY: ICD-10-CM

## 2024-04-04 DIAGNOSIS — E78.2 MIXED HYPERLIPIDEMIA: ICD-10-CM

## 2024-04-04 PROBLEM — M19.90 OA (OSTEOARTHRITIS): Status: ACTIVE | Noted: 2024-04-04

## 2024-04-04 PROBLEM — N18.9 CKD (CHRONIC KIDNEY DISEASE): Status: ACTIVE | Noted: 2024-04-04

## 2024-04-04 PROCEDURE — 1159F MED LIST DOCD IN RCRD: CPT | Performed by: STUDENT IN AN ORGANIZED HEALTH CARE EDUCATION/TRAINING PROGRAM

## 2024-04-04 PROCEDURE — 1125F AMNT PAIN NOTED PAIN PRSNT: CPT | Performed by: STUDENT IN AN ORGANIZED HEALTH CARE EDUCATION/TRAINING PROGRAM

## 2024-04-04 PROCEDURE — 1123F ACP DISCUSS/DSCN MKR DOCD: CPT | Performed by: STUDENT IN AN ORGANIZED HEALTH CARE EDUCATION/TRAINING PROGRAM

## 2024-04-04 PROCEDURE — 95251 CONT GLUC MNTR ANALYSIS I&R: CPT | Performed by: STUDENT IN AN ORGANIZED HEALTH CARE EDUCATION/TRAINING PROGRAM

## 2024-04-04 PROCEDURE — 3074F SYST BP LT 130 MM HG: CPT | Performed by: STUDENT IN AN ORGANIZED HEALTH CARE EDUCATION/TRAINING PROGRAM

## 2024-04-04 PROCEDURE — RXMED WILLOW AMBULATORY MEDICATION CHARGE

## 2024-04-04 PROCEDURE — 99214 OFFICE O/P EST MOD 30 MIN: CPT | Performed by: STUDENT IN AN ORGANIZED HEALTH CARE EDUCATION/TRAINING PROGRAM

## 2024-04-04 PROCEDURE — 1036F TOBACCO NON-USER: CPT | Performed by: STUDENT IN AN ORGANIZED HEALTH CARE EDUCATION/TRAINING PROGRAM

## 2024-04-04 PROCEDURE — 1170F FXNL STATUS ASSESSED: CPT | Performed by: STUDENT IN AN ORGANIZED HEALTH CARE EDUCATION/TRAINING PROGRAM

## 2024-04-04 PROCEDURE — 3078F DIAST BP <80 MM HG: CPT | Performed by: STUDENT IN AN ORGANIZED HEALTH CARE EDUCATION/TRAINING PROGRAM

## 2024-04-04 PROCEDURE — 1160F RVW MEDS BY RX/DR IN RCRD: CPT | Performed by: STUDENT IN AN ORGANIZED HEALTH CARE EDUCATION/TRAINING PROGRAM

## 2024-04-04 PROCEDURE — 99215 OFFICE O/P EST HI 40 MIN: CPT | Performed by: STUDENT IN AN ORGANIZED HEALTH CARE EDUCATION/TRAINING PROGRAM

## 2024-04-04 PROCEDURE — 1158F ADVNC CARE PLAN TLK DOCD: CPT | Performed by: STUDENT IN AN ORGANIZED HEALTH CARE EDUCATION/TRAINING PROGRAM

## 2024-04-04 PROCEDURE — G0439 PPPS, SUBSEQ VISIT: HCPCS | Performed by: STUDENT IN AN ORGANIZED HEALTH CARE EDUCATION/TRAINING PROGRAM

## 2024-04-04 RX ORDER — SERTRALINE HYDROCHLORIDE 100 MG/1
100 TABLET, FILM COATED ORAL DAILY
Qty: 90 TABLET | Refills: 3 | Status: SHIPPED | OUTPATIENT
Start: 2024-04-04 | End: 2025-04-04

## 2024-04-04 RX ORDER — ASPIRIN 81 MG/1
81 TABLET ORAL DAILY
Start: 2024-04-04

## 2024-04-04 RX ORDER — METOPROLOL SUCCINATE 50 MG/1
50 TABLET, EXTENDED RELEASE ORAL DAILY
Qty: 90 TABLET | Refills: 3 | Status: SHIPPED | OUTPATIENT
Start: 2024-04-04 | End: 2025-04-04

## 2024-04-04 RX ORDER — LOVASTATIN 40 MG/1
80 TABLET ORAL DAILY
Qty: 180 TABLET | Refills: 3 | Status: SHIPPED | OUTPATIENT
Start: 2024-04-04 | End: 2025-04-04

## 2024-04-04 RX ORDER — MULTIVITAMIN
1 TABLET ORAL DAILY
Start: 2024-04-04

## 2024-04-04 RX ORDER — GABAPENTIN 100 MG/1
100 CAPSULE ORAL NIGHTLY
Qty: 30 CAPSULE | Refills: 1 | Status: SHIPPED | OUTPATIENT
Start: 2024-04-04 | End: 2024-05-29 | Stop reason: SDUPTHER

## 2024-04-04 RX ORDER — METFORMIN HYDROCHLORIDE 500 MG/1
TABLET, EXTENDED RELEASE ORAL
Qty: 270 TABLET | Refills: 3 | Status: SHIPPED | OUTPATIENT
Start: 2024-04-04 | End: 2025-04-04

## 2024-04-04 RX ORDER — ZOLPIDEM TARTRATE 5 MG/1
5 TABLET ORAL NIGHTLY PRN
Start: 2024-04-04 | End: 2024-06-05 | Stop reason: SDUPTHER

## 2024-04-04 RX ORDER — INSULIN GLARGINE 100 [IU]/ML
INJECTION, SOLUTION SUBCUTANEOUS
Qty: 73.8 ML | Refills: 3 | Status: SHIPPED | OUTPATIENT
Start: 2024-04-04 | End: 2025-04-04

## 2024-04-04 RX ORDER — VIT C/E/ZN/COPPR/LUTEIN/ZEAXAN 250MG-90MG
25 CAPSULE ORAL DAILY
Start: 2024-04-04

## 2024-04-04 RX ORDER — PREDNISONE 5 MG/1
5 TABLET ORAL DAILY
Qty: 30 TABLET | Refills: 2 | Status: SHIPPED | OUTPATIENT
Start: 2024-04-04 | End: 2024-04-10 | Stop reason: SDUPTHER

## 2024-04-04 RX ORDER — LISINOPRIL 5 MG/1
5 TABLET ORAL DAILY
Qty: 90 TABLET | Refills: 3 | Status: SHIPPED | OUTPATIENT
Start: 2024-04-04 | End: 2025-04-04

## 2024-04-04 ASSESSMENT — ACTIVITIES OF DAILY LIVING (ADL)
DRESSING: INDEPENDENT
DOING_HOUSEWORK: INDEPENDENT
TAKING_MEDICATION: INDEPENDENT
BATHING: INDEPENDENT
MANAGING_FINANCES: INDEPENDENT
GROCERY_SHOPPING: INDEPENDENT

## 2024-04-04 ASSESSMENT — PAIN SCALES - GENERAL: PAINLEVEL: 5

## 2024-04-04 NOTE — PATIENT INSTRUCTIONS
Diagnoses and all orders for this visit:  Annual physical exam  -     Follow Up In Primary Care; Future  -     Follow Up In Primary Care; Future  Counseling regarding advanced care planning and goals of care  Encounter for routine laboratory testing  -     CBC and Auto Differential; Future  -     Basic Metabolic Panel; Future  -     Hepatic Function Panel; Future  -     Hemoglobin A1C; Future  -     Albumin , Urine Random; Future  -     Opiate/Opioid/Benzo Prescription Compliance; Future  Encounter for immunization  Type 2 diabetes mellitus with other specified complication, with long-term current use of insulin (CMS/[facility])  -     insulin glargine (Lantus Solostar U-100 Insulin) 100 unit/mL (3 mL) pen; Inject 52 Units under the skin once daily with breakfast and 30 units once daily in the evening. Take with meals.  -     metFORMIN XR (Glucophage-XR) 500 mg 24 hr tablet; Take 2 tablets (1,000 mg) by mouth once daily with breakfast AND 1 tablet (500 mg) once daily in the evening. Take with meals.  -     multivitamin tablet; Take 1 tablet by mouth once daily. One-A-Day Women's 50+ Complete Multivitamin  -     Hemoglobin A1C; Future  -     Albumin , Urine Random; Future  Polyneuropathy associated with underlying disease (CMS/[facility])  -     multivitamin tablet; Take 1 tablet by mouth once daily. One-A-Day Women's 50+ Complete Multivitamin  -     gabapentin (Neurontin) 100 mg capsule; Take 1 capsule (100 mg) by mouth once daily at bedtime.  Primary osteoarthritis involving multiple joints  Stage 3b chronic kidney disease (CMS/[facility])  -     CBC and Auto Differential; Future  Depression, unspecified depression type  -     zolpidem (Ambien) 5 mg tablet; Take 1 tablet (5 mg) by mouth as needed at bedtime for sleep.  -     sertraline (Zoloft) 100 mg tablet; Take 1 tablet (100 mg) by mouth once daily.  -     multivitamin tablet; Take 1 tablet by mouth once daily. One-A-Day Women's 50+ Complete Multivitamin  Temporal arteritis  (CMS/[facility])  -     aspirin 81 mg EC tablet; Take 1 tablet (81 mg) by mouth once daily.  -     predniSONE (Deltasone) 5 mg tablet; Take 1 tablet (5 mg) by mouth once daily.  History of diverticulitis  -     Referral to Gastroenterology; Future  Mixed hyperlipidemia  -     lovastatin (Mevacor) 40 mg tablet; Take 2 tablets (80 mg) by mouth once daily.  Primary hypertension  -     metoprolol succinate XL (Toprol-XL) 50 mg 24 hr tablet; Take 1 tablet (50 mg) by mouth once daily.  -     lisinopril 5 mg tablet; Take 1 tablet (5 mg) by mouth once daily.  Vitamin D deficiency  -     multivitamin tablet; Take 1 tablet by mouth once daily. One-A-Day Women's 50+ Complete Multivitamin  -     cholecalciferol (Vitamin D3) 25 MCG (1000 UT) capsule; Take 1 capsule (25 mcg) by mouth once daily.  Polypharmacy  -     Opiate/Opioid/Benzo Prescription Compliance; Future  Diverticulosis  -     Referral to Gastroenterology; Future  Anxiety  -     zolpidem (Ambien) 5 mg tablet; Take 1 tablet (5 mg) by mouth as needed at bedtime for sleep.  -     sertraline (Zoloft) 100 mg tablet; Take 1 tablet (100 mg) by mouth once daily.  -     multivitamin tablet; Take 1 tablet by mouth once daily. One-A-Day Women's 50+ Complete Multivitamin     Discussed routine and/or preventative care with the patient as outlined below:  - Labwork:   - Patient had labwork done for this appointment. Discussed today.   - Kidney function stable.  - Sugars don't look terrible. On CGM review, the patient is having the same problem that she always has where she is spiking in the morning following her breakfast. Will make a very tiny adjustment to her morning insulin and not touch the evening dosing.   - Persistent Vitamin D deficiency. Increase supplementation.  - Will order labwork for the patient to get one week prior to the Fall 2024 appointment.  - Imaging:   - Patient does not appear to be due for routine imaging at this time.  - Immunizations:   - Encouraged the patient  to get their shingles (shingrix) immunizations.  - Discussed seasonal immunizations, including the influenza and COVID-19 immunizations.   - Encouraged the patient to confirm that Living Will and Healthcare Power of  (HCPoA) are accurate and up to date.   - Significant medication and problem list reconciliation done today. Discussed opiate // controlled substance use with the patient. Notable that the tramadol is being prescribed by pain management and not myself.  - Vitals stable. No changes at this time.  - Patient has been dealing with significant low back pain. There is concern for lumbar spinal stenosis. MRI tomorrow and will be evaluated by pain management next week. Patient noting that she has been dealing with severe bilateral lower extremity neuropathy / burning due to both this and her long-term diabetes. Will trial low-dose gabapentin; discussed with her at length that we back to be careful with this due to the fact that she already has tramadol and Ambien on her list AND she has significant kidney dysfunction.  - Patient has been having recurrent bouts of diverticulitis. Is requesting that I give her antibiotics that she can keep on-hand for when this flares up in the future. Discussed with her that the guidelines for treatment of outpatient diverticulitis have changed and that we are no longer supposed to give outpatient antibiotics. Will refer the patient to gastroenterology per request.  - Mental health stable. No changes at this time.

## 2024-04-04 NOTE — Clinical Note
Ambrocio how to order more sensors. She is saying that insurance is saying that she can get a 3-month supply.

## 2024-04-05 ENCOUNTER — HOSPITAL ENCOUNTER (OUTPATIENT)
Dept: RADIOLOGY | Facility: HOSPITAL | Age: 84
Discharge: HOME | End: 2024-04-05
Payer: MEDICARE

## 2024-04-05 DIAGNOSIS — M48.062 SPINAL STENOSIS, LUMBAR REGION WITH NEUROGENIC CLAUDICATION: ICD-10-CM

## 2024-04-05 PROCEDURE — 72148 MRI LUMBAR SPINE W/O DYE: CPT | Performed by: RADIOLOGY

## 2024-04-05 PROCEDURE — 72148 MRI LUMBAR SPINE W/O DYE: CPT

## 2024-04-08 ENCOUNTER — PHARMACY VISIT (OUTPATIENT)
Dept: PHARMACY | Facility: CLINIC | Age: 84
End: 2024-04-08
Payer: COMMERCIAL

## 2024-04-08 ENCOUNTER — APPOINTMENT (OUTPATIENT)
Dept: RADIOLOGY | Facility: HOSPITAL | Age: 84
End: 2024-04-08
Payer: MEDICARE

## 2024-04-08 PROCEDURE — RXMED WILLOW AMBULATORY MEDICATION CHARGE

## 2024-04-08 RX ORDER — TRAMADOL HYDROCHLORIDE 50 MG/1
50 TABLET ORAL 2 TIMES DAILY PRN
Qty: 14 TABLET | Refills: 0 | OUTPATIENT
Start: 2024-04-08 | End: 2024-04-10 | Stop reason: ALTCHOICE

## 2024-04-10 ENCOUNTER — OFFICE VISIT (OUTPATIENT)
Dept: RHEUMATOLOGY | Facility: CLINIC | Age: 84
End: 2024-04-10
Payer: MEDICARE

## 2024-04-10 ENCOUNTER — LAB (OUTPATIENT)
Dept: LAB | Facility: LAB | Age: 84
End: 2024-04-10
Payer: MEDICARE

## 2024-04-10 VITALS — WEIGHT: 143 LBS | DIASTOLIC BLOOD PRESSURE: 74 MMHG | SYSTOLIC BLOOD PRESSURE: 128 MMHG | BODY MASS INDEX: 27.93 KG/M2

## 2024-04-10 DIAGNOSIS — M31.6 TEMPORAL ARTERITIS (MULTI): ICD-10-CM

## 2024-04-10 DIAGNOSIS — M81.0 OSTEOPOROSIS, UNSPECIFIED OSTEOPOROSIS TYPE, UNSPECIFIED PATHOLOGICAL FRACTURE PRESENCE: ICD-10-CM

## 2024-04-10 DIAGNOSIS — M31.6 TEMPORAL ARTERITIS (MULTI): Primary | ICD-10-CM

## 2024-04-10 PROCEDURE — 3078F DIAST BP <80 MM HG: CPT | Performed by: INTERNAL MEDICINE

## 2024-04-10 PROCEDURE — 36415 COLL VENOUS BLD VENIPUNCTURE: CPT

## 2024-04-10 PROCEDURE — 3074F SYST BP LT 130 MM HG: CPT | Performed by: INTERNAL MEDICINE

## 2024-04-10 PROCEDURE — 1159F MED LIST DOCD IN RCRD: CPT | Performed by: INTERNAL MEDICINE

## 2024-04-10 PROCEDURE — 1160F RVW MEDS BY RX/DR IN RCRD: CPT | Performed by: INTERNAL MEDICINE

## 2024-04-10 PROCEDURE — 99214 OFFICE O/P EST MOD 30 MIN: CPT | Performed by: INTERNAL MEDICINE

## 2024-04-10 PROCEDURE — 86140 C-REACTIVE PROTEIN: CPT

## 2024-04-10 PROCEDURE — 99204 OFFICE O/P NEW MOD 45 MIN: CPT | Performed by: INTERNAL MEDICINE

## 2024-04-10 RX ORDER — PREDNISONE 5 MG/1
5 TABLET ORAL DAILY
Qty: 90 TABLET | Refills: 1 | Status: SHIPPED | OUTPATIENT
Start: 2024-04-10 | End: 2024-10-07

## 2024-04-10 NOTE — PROGRESS NOTES
"NP  Previous Roger patient for treatment of GCA.  Treatment with Actemra.    Currently taking prednisone 5 mg daily and doing well.  Retinal specialist who stated no need to come back for 6 months.   States she was having chronic persistant headaches prior to diagnosis.  Now c/o headaches off and on \" nothing like they used to be \"    HPI - she is here with GCA, dx 2 yrs ago.  She has had chronic HA (per chart at least as far back as 2018).  She has a h/o migraines that resolved after hysterectomy.  She started getting bad HA lasting 2 wks starting 2 yrs ago.  She saw her primary who sent her to rheum.  She had B TA bx.  She was on high-dose pred, and she was also on actemra which made her very sick with diverticulitis, so she stopped it.  She is currently on prednisone 5mg and doing well.  She gets occ HA, none in the past 6 wks.  She has chronic back pain for which she sees pain mgmt.  No fever but mild night sweats intermittently.  No tongue/jaw gareth.  She has a new bridge that doesn't fit well and will be going back.  She has sl L hand numbness.   No visual changes.  Some dry mouth - biotene helps.  No mouth sores but sometimes on lips.  No raynauds or rash.  No CP, resp, or GI other than sl soreness after divertic.      FH - +arthritis.  No CTD or PMR/GCA.  No parental hip fx  SH - nonsmoker.  Occ EtOH.    PE  Gen - NAD  HEENT - good TA pulses, NT, no head/neck bruits  Neck - supple,no LAD  CV - RRR I/VI syst murmut  Lungs - CTA  Abd - +BS  Extr - 2+ DP, PT, and rad pulses.  No edema  Psych - nl affect  Neuro - nl strength.  Difficulty eliciting B ankle DTRs  Msk - no synovitis    A/P - GCA doing well on low-dose prednisone.  She had B TA bx, although I can only find path results for L that did not show arteritis.  All CRPs have been nl.  Highest ESR was 53 (ULN for age/gender at that time was 46)  Per one note, there was mention of thrombosis in the R TA.  She was on actemra but discontinued d/t recurrent " diverticulitis.    OP - couldn't raina oral bisphosphonates.  Prolia was expensive.  Discussed reclast as alternative.  She will repeat her DEXA and consider treatment after - will discuss at next OV  Chronic back pain - sees pain mgmt  Check CRP  Reeval 3 mo or sooner PRN

## 2024-04-11 LAB — CRP SERPL-MCNC: <0.1 MG/DL

## 2024-04-19 ENCOUNTER — HOSPITAL ENCOUNTER (OUTPATIENT)
Dept: RADIOLOGY | Facility: HOSPITAL | Age: 84
Discharge: HOME | End: 2024-04-19
Payer: MEDICARE

## 2024-04-19 ENCOUNTER — PHARMACY VISIT (OUTPATIENT)
Dept: PHARMACY | Facility: CLINIC | Age: 84
End: 2024-04-19
Payer: COMMERCIAL

## 2024-04-19 DIAGNOSIS — M81.0 OSTEOPOROSIS, UNSPECIFIED OSTEOPOROSIS TYPE, UNSPECIFIED PATHOLOGICAL FRACTURE PRESENCE: ICD-10-CM

## 2024-04-19 PROCEDURE — RXMED WILLOW AMBULATORY MEDICATION CHARGE

## 2024-04-19 PROCEDURE — 77080 DXA BONE DENSITY AXIAL: CPT | Performed by: RADIOLOGY

## 2024-04-19 PROCEDURE — 77080 DXA BONE DENSITY AXIAL: CPT

## 2024-04-19 RX ORDER — AMOXICILLIN 500 MG/1
CAPSULE ORAL
Qty: 21 CAPSULE | Refills: 0 | OUTPATIENT
Start: 2024-03-25 | End: 2024-06-03 | Stop reason: ALTCHOICE

## 2024-04-30 ENCOUNTER — PHARMACY VISIT (OUTPATIENT)
Dept: PHARMACY | Facility: CLINIC | Age: 84
End: 2024-04-30
Payer: COMMERCIAL

## 2024-04-30 PROCEDURE — RXMED WILLOW AMBULATORY MEDICATION CHARGE

## 2024-05-08 ENCOUNTER — PHARMACY VISIT (OUTPATIENT)
Dept: PHARMACY | Facility: CLINIC | Age: 84
End: 2024-05-08
Payer: COMMERCIAL

## 2024-05-08 ENCOUNTER — EVALUATION (OUTPATIENT)
Dept: PHYSICAL THERAPY | Facility: CLINIC | Age: 84
End: 2024-05-08
Payer: MEDICARE

## 2024-05-08 DIAGNOSIS — M48.062 SPINAL STENOSIS, LUMBAR REGION WITH NEUROGENIC CLAUDICATION: ICD-10-CM

## 2024-05-08 PROCEDURE — 97014 ELECTRIC STIMULATION THERAPY: CPT | Mod: GP

## 2024-05-08 PROCEDURE — RXMED WILLOW AMBULATORY MEDICATION CHARGE

## 2024-05-08 PROCEDURE — 97110 THERAPEUTIC EXERCISES: CPT | Mod: GP

## 2024-05-08 PROCEDURE — 97162 PT EVAL MOD COMPLEX 30 MIN: CPT | Mod: GP

## 2024-05-08 ASSESSMENT — ENCOUNTER SYMPTOMS: PAIN SCALE AT HIGHEST: 9

## 2024-05-08 NOTE — PROGRESS NOTES
Physical Therapy Evaluation and Treatment     Patient Name: Sheila Sanchez  MRN: [id number]  PT Received on: 2024  Time Calculation  Start Time: 1150  Stop Time: 1244  Time Calculation (min): 54 min  PT Evaluation Time Entry  PT Evaluation (Moderate) Time Entry: 30  PT Modalities Time Entry  E-Stim (Unattended) Time Entry: 12  PT Therapeutic Procedures Time Entry  Therapeutic Exercise Time Entry: 12    Visit # 1 of 8  Visits/Dates Authorized: [employer] - NO AUTH / [place] VISITS / $40 COPAY    Current Problem:   Problem List Items Addressed This Visit             ICD-10-CM    Spinal stenosis, lumbar region with neurogenic claudication M48.062    Relevant Orders    Follow Up In Physical Therapy     Precautions:   N/A    Subjective Evaluation    History of Present Illness  Date of onset: 2024  Mechanism of injury: 83 year old female retiree presenting with low back and radiating R>L LE pain to behind knee. Symptoms began of no particular cause, and have been progressing to the point that she has great difficulty standing and walking for any length of time. She previously had 2 injections in low back, but without noticeable change in symptoms. She lives alone and takes care of her house and yard by herself. 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 She does not use a cane or walker but has access to them if needed. She reports additional medical history of neuropathy in bilateral feet, which affects her balance. She denies having any falls over the past year.     Pain  At worst pain ratin  Location: Low back and R>L glute and posterior thigh to knee  Quality: radiating, dull ache and tight  Relieving factors: change in position, heat and medications  Exacerbated by: standing, walking, bed mobility.    Diagnostic Tests  MRI studies: abnormal (24 MRI - Lumbar: Multilevel degenerative changes are present as noted with severe  stenosis more so on the left at  L4-5.)    Treatments  Previous treatment: injection treatment  Patient Goals  Patient goals for therapy: decreased pain, increased motion, increased strength, improved balance and independence with ADLs/IADLs        Objective     Postural Observations    Additional Postural Observation Details  Forward head, rounded shoulders, thoracic hyperkyphosis    Neurological Testing     Sensation     Lumbar   Left   Diminished: light touch and proprioception  Paresthesia: light touch    Right   Diminished: light touch and proprioception  Paresthesia: light touch    Palpation   Left   Hypertonic in the erector spinae.     Right   Hypertonic in the erector spinae.     Active Range of Motion   Cervical/Thoracic Spine     Thoracic   Flexion: WFL  Extension: Active thoracic extension: stiff, hyperkyphotic posture. with pain    Lumbar   Flexion: WFL  Extension: Active lumbar extension: 75% limited. with pain  Left lateral flexion: WFL  Right lateral flexion: Active right lumbar lateral flexion: 75% limited, R low back and LE pain. with pain    Strength/Myotome Testing     Left Hip   Planes of Motion   Flexion: 4-  Extension: 4-  Abduction: 4-  Adduction: 4+    Right Hip   Planes of Motion   Flexion: 4-  Extension: 4-  Abduction: 4-  Adduction: 4+    Left Knee   Flexion: 4  Extension: 5    Right Knee   Flexion: 4  Extension: 5    Left Ankle/Foot   Dorsiflexion: 4+    Right Ankle/Foot   Dorsiflexion: 4+    Tests     Lumbar     Left   Positive passive SLR.   Negative crossed SLR.     Right   Positive passive SLR.   Negative crossed SLR.     Additional Tests Details  Neural tension noted R>L with SLR    General Comments     Spine Comments  Additional findings:  Ambulation: No assistive device, walks slowly with asymmetric and inconsistent step length. Decreased stance time and pushoff on R.     Other Measures  Other Outcome Measures: Modified LBD Questionnaire = 28% impaired    Treatments:  Therapeutic Exercise:   LTR x20  PPT  "10x5\"  DKTC stretch 5x10\" (difficulty lowering RLE to mat)  Supine LE nerve glides  DL bridge (limited range)  HEP and discussion of possible benefits of home TENS unit for pain    Neuromuscular Re-Ed:     Manual Therapy:     Modalities:   Unattended e-stim: IFC: applied to lumbosacral, Intensity to tolerance, for 12 minutes, in Supine , with wedge, with moist heat      Assessment & Plan     Assessment  Impairments: abnormal gait, abnormal or restricted ROM, activity intolerance, impaired balance, impaired physical strength, lacks appropriate home exercise program and pain with function  Assessment details: Patient presents with low back pain with mobility deficits. Lumbar extension and standing/walking intolerance with radiating R>L LE pain. BLE neuropathy and general posture and strength deficits contribute to decreased balance.   Prognosis: fair    Plan  Therapy options: will be seen for skilled physical therapy services  Planned modality interventions: cryotherapy, electrical stimulation/Russian stimulation, TENS, thermotherapy (hydrocollator packs), ultrasound and traction  Other planned modality interventions: Dry needling  Planned therapy interventions: therapeutic activities, stretching, strengthening, spinal/joint mobilization, soft tissue mobilization, postural training, neuromuscular re-education, manual therapy, abdominal trunk stabilization, body mechanics training, flexibility, functional ROM exercises, home exercise program, joint mobilization, balance/weight-bearing training and gait training  Frequency: 1x week  Duration in visits: 8  Treatment plan discussed with: patient       Moderate complexity due to patient's clinical presentation being evolving with changing characteristics, with comorbidities/complexities to include patient age, h/o diabetes, neuropathy, HTN, osteoporosis, observed severe lumbar stenosis observed on recent MRI , all of which may negatively impact rehab tolerance and " progression.     Post-Treatment Pain:  Response to Interventions: Felt better immediately after treatment, pain returns with prolonged standing/walking    Goals:   Active       PT Problem       STGs        Start:  05/08/24    Expected End:  05/22/24       Independent with HEP.         LTGs       Start:  05/08/24    Expected End:  08/06/24       1) Pt will report pain levels no greater than 3/10 at worst with activity for less difficulty standing, walking, bending, and lifting from the floor.  2) Pt will display improved pain-free lumbar spine AROM WFL for less difficulty bending, lifting, transferring and performing normal house and yard work activities.  3) Pt will be able to ambulate household and community distances comfortably without AD.  4) Pt will score <20% impairment on Low Back Disability Questionnaire to indicate significant improvement in low back function.  5) Pt will improve bilateral hip and knee strength to at least 4+/5 for all major muscle groups for improved functional strength with transferring, stairs, and general mobility.

## 2024-05-09 PROBLEM — M48.062 SPINAL STENOSIS, LUMBAR REGION WITH NEUROGENIC CLAUDICATION: Status: ACTIVE | Noted: 2024-05-09

## 2024-05-09 ASSESSMENT — ENCOUNTER SYMPTOMS
ALLEVIATING FACTORS: MEDICATIONS
QUALITY: RADIATING
QUALITY: DULL ACHE
ALLEVIATING FACTORS: HEAT
QUALITY: TIGHT
ALLEVIATING FACTORS: CHANGE IN POSITION

## 2024-05-09 ASSESSMENT — PAIN - FUNCTIONAL ASSESSMENT: PAIN_FUNCTIONAL_ASSESSMENT: 0-10

## 2024-05-09 ASSESSMENT — ACTIVITIES OF DAILY LIVING (ADL): POOR_BALANCE: 1

## 2024-05-18 ENCOUNTER — PHARMACY VISIT (OUTPATIENT)
Dept: PHARMACY | Facility: CLINIC | Age: 84
End: 2024-05-18
Payer: COMMERCIAL

## 2024-05-18 PROCEDURE — RXMED WILLOW AMBULATORY MEDICATION CHARGE

## 2024-05-21 ENCOUNTER — PHARMACY VISIT (OUTPATIENT)
Dept: PHARMACY | Facility: CLINIC | Age: 84
End: 2024-05-21

## 2024-05-23 PROCEDURE — RXMED WILLOW AMBULATORY MEDICATION CHARGE

## 2024-05-29 ENCOUNTER — TREATMENT (OUTPATIENT)
Dept: PHYSICAL THERAPY | Facility: CLINIC | Age: 84
End: 2024-05-29
Payer: MEDICARE

## 2024-05-29 ENCOUNTER — PHARMACY VISIT (OUTPATIENT)
Dept: PHARMACY | Facility: CLINIC | Age: 84
End: 2024-05-29
Payer: COMMERCIAL

## 2024-05-29 DIAGNOSIS — G63 POLYNEUROPATHY ASSOCIATED WITH UNDERLYING DISEASE (MULTI): ICD-10-CM

## 2024-05-29 DIAGNOSIS — M48.062 SPINAL STENOSIS, LUMBAR REGION WITH NEUROGENIC CLAUDICATION: ICD-10-CM

## 2024-05-29 PROCEDURE — 97014 ELECTRIC STIMULATION THERAPY: CPT | Mod: GP | Performed by: PHYSICAL THERAPIST

## 2024-05-29 PROCEDURE — RXMED WILLOW AMBULATORY MEDICATION CHARGE

## 2024-05-29 PROCEDURE — 97110 THERAPEUTIC EXERCISES: CPT | Mod: GP | Performed by: PHYSICAL THERAPIST

## 2024-05-29 ASSESSMENT — PAIN SCALES - GENERAL: PAINLEVEL_OUTOF10: 0 - NO PAIN

## 2024-05-29 ASSESSMENT — PAIN - FUNCTIONAL ASSESSMENT: PAIN_FUNCTIONAL_ASSESSMENT: 0-10

## 2024-05-29 NOTE — PROGRESS NOTES
Physical Therapy Treatment    Patient Name: Sheila Sanchez  MRN: [id number]  PT Received on: 5/29/2024 PT Received On: 05/29/24  Time Calculation  Start Time: 1315  Stop Time: 1400  Time Calculation (min): 45 min  PT Modalities Time Entry  E-Stim (Unattended) Time Entry: 15  PT Therapeutic Procedures Time Entry  Therapeutic Exercise Time Entry: 28    Visit # 2 of 30  Visits/Dates Authorized: [place]    Current Problem:   Problem List Items Addressed This Visit             ICD-10-CM    Spinal stenosis, lumbar region with neurogenic claudication M48.062     Surgery:   Surgery date:     Precautions:        Subjective   General:   General Comment: Pt reprots she was doing her HEP 2x a day, then down to 1x a day, but the last 4-5 days she has not done it. She hada celebration of life last weekend for her  and was busy planning for that. 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, she is able to but it causes LBP)    Pre-Treatment Symptoms:   Pain Assessment: 0-10  Pain Score: 0 - No pain    Objective   Findings:          Treatments:      Therapeutic Exercise:   Nustep L5 6 min  Tband sidesteps and monsters- orange  Lean on counter hip x12- orange  Piriformis stretch 2x30s  LTR x20  PPT + progressions: marches, hip add, knee fall- a lot of cueing x15 ea  Bridges with sm ball btwn knees x15  H/L hip abd- green x15    Neuromuscular Re-Ed:       Therapeutic Activity:      Gait Training:       Manual Therapy:       Modalities:   Unattended e-stim: IFC: applied to bilateral lumbar paraspinals, Intensity to tolerance, for 15 minutes, in Supine , with wedgewith MHP      Assessment:   PT Assessment  Assessment Comment: tolerated new ther ex well, stated she will get consistent with her HEP again.    Post-Treatment Symptoms:   Response to Interventions: felt better afer  session    Plan:   OP PT Plan  PT Frequency: 2 times per weekCont to progress neutral spine strengthening and increase tolerance to extension to improve  standing tolerance and ambulation tolerance.     Goals:   Active       PT Problem       STGs        Start:  05/08/24    Expected End:  05/22/24       Independent with HEP.         LTGs       Start:  05/08/24    Expected End:  08/06/24       1) Pt will report pain levels no greater than 3/10 at worst with activity for less difficulty standing, walking, bending, and lifting from the floor.  2) Pt will display improved pain-free lumbar spine AROM WFL for less difficulty bending, lifting, transferring and performing normal house and yard work activities.  3) Pt will be able to ambulate household and community distances comfortably without AD.  4) Pt will score <20% impairment on Low Back Disability Questionnaire to indicate significant improvement in low back function.  5) Pt will improve bilateral hip and knee strength to at least 4+/5 for all major muscle groups for improved functional strength with transferring, stairs, and general mobility.

## 2024-05-31 RX ORDER — GABAPENTIN 100 MG/1
100 CAPSULE ORAL NIGHTLY
Qty: 30 CAPSULE | Refills: 1 | Status: SHIPPED | OUTPATIENT
Start: 2024-05-31 | End: 2024-07-30

## 2024-06-03 ENCOUNTER — OFFICE VISIT (OUTPATIENT)
Dept: PRIMARY CARE | Facility: CLINIC | Age: 84
End: 2024-06-03
Payer: MEDICARE

## 2024-06-03 VITALS
HEART RATE: 80 BPM | SYSTOLIC BLOOD PRESSURE: 138 MMHG | WEIGHT: 143 LBS | TEMPERATURE: 96.9 F | BODY MASS INDEX: 28.07 KG/M2 | DIASTOLIC BLOOD PRESSURE: 70 MMHG | HEIGHT: 60 IN | OXYGEN SATURATION: 98 %

## 2024-06-03 DIAGNOSIS — M31.6 TEMPORAL ARTERITIS (MULTI): ICD-10-CM

## 2024-06-03 DIAGNOSIS — Z79.899 POLYPHARMACY: ICD-10-CM

## 2024-06-03 DIAGNOSIS — F41.9 ANXIETY: ICD-10-CM

## 2024-06-03 DIAGNOSIS — E11.69 TYPE 2 DIABETES MELLITUS WITH OTHER SPECIFIED COMPLICATION, WITH LONG-TERM CURRENT USE OF INSULIN (MULTI): Primary | ICD-10-CM

## 2024-06-03 DIAGNOSIS — E78.2 MIXED HYPERLIPIDEMIA: ICD-10-CM

## 2024-06-03 DIAGNOSIS — I10 PRIMARY HYPERTENSION: ICD-10-CM

## 2024-06-03 DIAGNOSIS — F32.A DEPRESSION, UNSPECIFIED DEPRESSION TYPE: ICD-10-CM

## 2024-06-03 DIAGNOSIS — N18.32 STAGE 3B CHRONIC KIDNEY DISEASE (MULTI): ICD-10-CM

## 2024-06-03 DIAGNOSIS — E55.9 VITAMIN D DEFICIENCY: ICD-10-CM

## 2024-06-03 DIAGNOSIS — G63 POLYNEUROPATHY ASSOCIATED WITH UNDERLYING DISEASE (MULTI): ICD-10-CM

## 2024-06-03 DIAGNOSIS — Z01.89 ENCOUNTER FOR ROUTINE LABORATORY TESTING: ICD-10-CM

## 2024-06-03 DIAGNOSIS — M15.9 PRIMARY OSTEOARTHRITIS INVOLVING MULTIPLE JOINTS: ICD-10-CM

## 2024-06-03 DIAGNOSIS — Z79.4 TYPE 2 DIABETES MELLITUS WITH OTHER SPECIFIED COMPLICATION, WITH LONG-TERM CURRENT USE OF INSULIN (MULTI): Primary | ICD-10-CM

## 2024-06-03 PROCEDURE — 1159F MED LIST DOCD IN RCRD: CPT | Performed by: STUDENT IN AN ORGANIZED HEALTH CARE EDUCATION/TRAINING PROGRAM

## 2024-06-03 PROCEDURE — 3078F DIAST BP <80 MM HG: CPT | Performed by: STUDENT IN AN ORGANIZED HEALTH CARE EDUCATION/TRAINING PROGRAM

## 2024-06-03 PROCEDURE — 1125F AMNT PAIN NOTED PAIN PRSNT: CPT | Performed by: STUDENT IN AN ORGANIZED HEALTH CARE EDUCATION/TRAINING PROGRAM

## 2024-06-03 PROCEDURE — 3075F SYST BP GE 130 - 139MM HG: CPT | Performed by: STUDENT IN AN ORGANIZED HEALTH CARE EDUCATION/TRAINING PROGRAM

## 2024-06-03 PROCEDURE — 1036F TOBACCO NON-USER: CPT | Performed by: STUDENT IN AN ORGANIZED HEALTH CARE EDUCATION/TRAINING PROGRAM

## 2024-06-03 PROCEDURE — 99214 OFFICE O/P EST MOD 30 MIN: CPT | Performed by: STUDENT IN AN ORGANIZED HEALTH CARE EDUCATION/TRAINING PROGRAM

## 2024-06-03 PROCEDURE — 1160F RVW MEDS BY RX/DR IN RCRD: CPT | Performed by: STUDENT IN AN ORGANIZED HEALTH CARE EDUCATION/TRAINING PROGRAM

## 2024-06-03 PROCEDURE — 95251 CONT GLUC MNTR ANALYSIS I&R: CPT | Performed by: STUDENT IN AN ORGANIZED HEALTH CARE EDUCATION/TRAINING PROGRAM

## 2024-06-03 PROCEDURE — G2211 COMPLEX E/M VISIT ADD ON: HCPCS | Performed by: STUDENT IN AN ORGANIZED HEALTH CARE EDUCATION/TRAINING PROGRAM

## 2024-06-03 ASSESSMENT — PATIENT HEALTH QUESTIONNAIRE - PHQ9
2. FEELING DOWN, DEPRESSED OR HOPELESS: NOT AT ALL
7. TROUBLE CONCENTRATING ON THINGS, SUCH AS READING THE NEWSPAPER OR WATCHING TELEVISION: NOT AT ALL
8. MOVING OR SPEAKING SO SLOWLY THAT OTHER PEOPLE COULD HAVE NOTICED. OR THE OPPOSITE, BEING SO FIGETY OR RESTLESS THAT YOU HAVE BEEN MOVING AROUND A LOT MORE THAN USUAL: NOT AT ALL
4. FEELING TIRED OR HAVING LITTLE ENERGY: SEVERAL DAYS
10. IF YOU CHECKED OFF ANY PROBLEMS, HOW DIFFICULT HAVE THESE PROBLEMS MADE IT FOR YOU TO DO YOUR WORK, TAKE CARE OF THINGS AT HOME, OR GET ALONG WITH OTHER PEOPLE: NOT DIFFICULT AT ALL
3. TROUBLE FALLING OR STAYING ASLEEP OR SLEEPING TOO MUCH: NOT AT ALL
1. LITTLE INTEREST OR PLEASURE IN DOING THINGS: NOT AT ALL
9. THOUGHTS THAT YOU WOULD BE BETTER OFF DEAD, OR OF HURTING YOURSELF: NOT AT ALL
1. LITTLE INTEREST OR PLEASURE IN DOING THINGS: NEARLY EVERY DAY
SUM OF ALL RESPONSES TO PHQ9 QUESTIONS 1 AND 2: 6
SUM OF ALL RESPONSES TO PHQ QUESTIONS 1-9: 7
2. FEELING DOWN, DEPRESSED OR HOPELESS: NEARLY EVERY DAY
5. POOR APPETITE OR OVEREATING: NOT AT ALL
SUM OF ALL RESPONSES TO PHQ9 QUESTIONS 1 AND 2: 0
6. FEELING BAD ABOUT YOURSELF - OR THAT YOU ARE A FAILURE OR HAVE LET YOURSELF OR YOUR FAMILY DOWN: NOT AT ALL

## 2024-06-03 ASSESSMENT — ENCOUNTER SYMPTOMS
GASTROINTESTINAL NEGATIVE: 1
CONSTITUTIONAL NEGATIVE: 1
CARDIOVASCULAR NEGATIVE: 1
RESPIRATORY NEGATIVE: 1

## 2024-06-03 ASSESSMENT — PAIN SCALES - GENERAL: PAINLEVEL: 6

## 2024-06-03 NOTE — PATIENT INSTRUCTIONS
- Significant medication and problem list reconciliation done today.  - Patient did not require labwork for this appointment.  - Patient has already had labs ordered for their next appointment.  - Vitals look great. Do not need to make changes at this time.  - Encouraged continued diet and exercise modification.  - Patient continues to note back pain, is working with physical therapy and pain management. Will defer further management to them.  - Patient's sugars reviewed today. Patient continues to remain a bit all over the place; however, the average looks pretty good, and considering how brittle the patient has become, I think that this is acceptable and I do not think that I should make changes at this time.

## 2024-06-03 NOTE — PROGRESS NOTES
[facility]: [facility]  PROGRESS NOTE      Sheila Sanchez is a 83 y.o. female that is presenting today for Follow-up.    Assessment/Plan   Diagnoses and all orders for this visit:  Type 2 diabetes mellitus with other specified complication, with long-term current use of insulin (Multi)  Polyneuropathy associated with underlying disease (Multi)  Primary osteoarthritis involving multiple joints  Depression, unspecified depression type  Stage 3b chronic kidney disease (Multi)  Temporal arteritis (Multi)  Mixed hyperlipidemia  Primary hypertension  Vitamin D deficiency  Polypharmacy  Anxiety  Encounter for routine laboratory testing  -     Follow Up In Primary Care    Current Outpatient Medications   Medication Instructions    aspirin 81 mg, oral, Daily    blood sugar diagnostic strip as directed every 12 hours for 90 days    cholecalciferol (VITAMIN D3) 25 mcg, oral, Daily    flash glucose sensor kit (FreeStyle Júnior 2 Sensor) kit USE AS DIRECTED TO MONITOR BLOOD SUGAR AND CHANGE EVERY 14 DAYS    gabapentin (NEURONTIN) 100 mg, oral, Nightly    insulin glargine (Lantus Solostar U-100 Insulin) 100 unit/mL (3 mL) pen Inject 52 Units under the skin once daily with breakfast and 30 units once daily in the evening. Take with meals.    lidocaine (Lidoderm) 5 % patch 1 patch remove after 12 hours Externally Once a day prn    lisinopril 5 mg, oral, Daily    lovastatin (MEVACOR) 80 mg, oral, Daily    metFORMIN XR (Glucophage-XR) 500 mg 24 hr tablet Take 2 tablets (1,000 mg) by mouth once daily with breakfast AND 1 tablet (500 mg) once daily in the evening. Take with meals.    metoprolol succinate XL (TOPROL-XL) 50 mg, oral, Daily    multivitamin tablet 1 tablet, oral, Daily, One-A-Day Women's 50+ Complete Multivitamin    predniSONE (DELTASONE) 5 mg, oral, Daily    sertraline (ZOLOFT) 100 mg, oral, Daily    traMADol (Ultram) 50 mg tablet Take 1 tablet by mouth twice daily as needed for pain    zolpidem  (AMBIEN) 5 mg, oral, Nightly PRN     - Significant medication and problem list reconciliation done today.  - Patient did not require labwork for this appointment.  - Patient has already had labs ordered for their next appointment.  - Vitals look great. Do not need to make changes at this time.  - Encouraged continued diet and exercise modification.  - Patient continues to note back pain, is working with physical therapy and pain management. Will defer further management to them.  - Patient's sugars reviewed today. Patient continues to remain a bit all over the place; however, the average looks pretty good, and considering how brittle the patient has become, I think that this is acceptable and I do not think that I should make changes at this time.    Subjective   - The patient otherwise feels well and denies any acute symptoms or concerns at this time.  - The patient denies any changes or progression of their chronic medical problems.  - The patient denies any problems or concerns with their medications.      Review of Systems   Constitutional: Negative.    Respiratory: Negative.     Cardiovascular: Negative.    Gastrointestinal: Negative.    All other systems reviewed and are negative.     Objective   Vitals:    06/03/24 1135   BP: 138/70   Pulse: 80   Temp: 36.1 °C (96.9 °F)   SpO2: 98%      Body mass index is 27.93 kg/m².  Physical Exam  Vitals and nursing note reviewed.   Constitutional:       General: She is not in acute distress.  Neck:      Vascular: No carotid bruit.   Cardiovascular:      Rate and Rhythm: Normal rate and regular rhythm.      Heart sounds: Normal heart sounds.   Pulmonary:      Effort: Pulmonary effort is normal.      Breath sounds: Normal breath sounds.   Musculoskeletal:         General: No swelling.   Neurological:      Mental Status: She is alert. Mental status is at baseline.   Psychiatric:         Mood and Affect: Mood normal.       Diagnostic Results   Lab Results   Component Value  "Date    GLUCOSE 183 (H) 02/21/2024    CALCIUM 9.5 02/21/2024     02/21/2024    K 4.4 02/21/2024    CO2 21 (L) 02/21/2024     02/21/2024    BUN 25 02/21/2024    CREATININE 1.30 02/21/2024     Lab Results   Component Value Date    ALT 17 02/21/2024    AST 20 02/21/2024    ALKPHOS 60 02/21/2024    BILITOT 0.2 02/21/2024     Lab Results   Component Value Date    WBC 10.3 02/21/2024    HGB 12.1 02/21/2024    HCT 37.3 02/21/2024    MCV 91 02/21/2024     02/21/2024     Lab Results   Component Value Date    CHOL 183 02/21/2024    CHOL 181 02/24/2023    CHOL 172 03/29/2022     Lab Results   Component Value Date    HDL 53.0 02/21/2024    HDL 46 (L) 02/24/2023    HDL 47 (L) 03/29/2022     Lab Results   Component Value Date    LDLCALC 72 02/21/2024    LDLCALC 71 02/24/2023    LDLCALC 73 03/29/2022     Lab Results   Component Value Date    TRIG 289 (H) 02/21/2024    TRIG 319 (H) 02/24/2023    TRIG 262 (H) 03/29/2022     No components found for: \"CHOLHDL\"  Lab Results   Component Value Date    HGBA1C 7.3 (H) 02/21/2024     Other labs not included in the list above were reviewed either before or during this encounter.    History    History reviewed. No pertinent past medical history.  History reviewed. No pertinent surgical history.  No family history on file.  Social History     Socioeconomic History    Marital status:      Spouse name: Not on file    Number of children: Not on file    Years of education: Not on file    Highest education level: Not on file   Occupational History    Not on file   Tobacco Use    Smoking status: Never    Smokeless tobacco: Never   Substance and Sexual Activity    Alcohol use: Never    Drug use: Never    Sexual activity: Not on file   Other Topics Concern    Not on file   Social History Narrative    Not on file     Social Determinants of Health     Financial Resource Strain: Not on file   Food Insecurity: Not on file   Transportation Needs: Not on file   Physical Activity: " Not on file   Stress: Not on file   Social Connections: Not on file   Intimate Partner Violence: Not on file   Housing Stability: Not on file     Allergies   Allergen Reactions    Alendronate Other     stomach upset    Ibandronate Other     stomach upset    Meperidine Other     stomach upset    Tetanus Toxoid Adsorbed Unknown     Current Outpatient Medications on File Prior to Visit   Medication Sig Dispense Refill    aspirin 81 mg EC tablet Take 1 tablet (81 mg) by mouth once daily.      blood sugar diagnostic strip as directed every 12 hours for 90 days      cholecalciferol (Vitamin D3) 25 MCG (1000 UT) capsule Take 1 capsule (25 mcg) by mouth once daily.      flash glucose sensor kit (FreeStyle Júnior 2 Sensor) kit USE AS DIRECTED TO MONITOR BLOOD SUGAR AND CHANGE EVERY 14 DAYS 2 each 11    gabapentin (Neurontin) 100 mg capsule Take 1 capsule (100 mg) by mouth once daily at bedtime. 30 capsule 1    insulin glargine (Lantus Solostar U-100 Insulin) 100 unit/mL (3 mL) pen Inject 52 Units under the skin once daily with breakfast and 30 units once daily in the evening. Take with meals. 73.8 mL 3    lidocaine (Lidoderm) 5 % patch 1 patch remove after 12 hours Externally Once a day prn      lisinopril 5 mg tablet Take 1 tablet (5 mg) by mouth once daily. 90 tablet 3    lovastatin (Mevacor) 40 mg tablet Take 2 tablets (80 mg) by mouth once daily. 180 tablet 3    metFORMIN XR (Glucophage-XR) 500 mg 24 hr tablet Take 2 tablets (1,000 mg) by mouth once daily with breakfast AND 1 tablet (500 mg) once daily in the evening. Take with meals. 270 tablet 3    metoprolol succinate XL (Toprol-XL) 50 mg 24 hr tablet Take 1 tablet (50 mg) by mouth once daily. 90 tablet 3    multivitamin tablet Take 1 tablet by mouth once daily. One-A-Day Women's 50+ Complete Multivitamin      predniSONE (Deltasone) 5 mg tablet Take 1 tablet (5 mg) by mouth once daily. 90 tablet 1    sertraline (Zoloft) 100 mg tablet Take 1 tablet (100 mg) by mouth  once daily. 90 tablet 3    traMADol (Ultram) 50 mg tablet Take 1 tablet by mouth twice daily as needed for pain 25 tablet 1    zolpidem (Ambien) 5 mg tablet Take 1 tablet (5 mg) by mouth as needed at bedtime for sleep.      [DISCONTINUED] amoxicillin (Amoxil) 500 mg capsule Take 1 capsule by mouth three times daily until gone (Patient not taking: Reported on 6/3/2024) 21 capsule 0    [DISCONTINUED] gabapentin (Neurontin) 100 mg capsule Take 1 capsule (100 mg) by mouth once daily at bedtime. 30 capsule 1    [DISCONTINUED] traMADol (Ultram) 50 mg tablet Take 1 tablet by mouth twice daily as needed pain (Patient not taking: Reported on 6/3/2024) 14 tablet 0     No current facility-administered medications on file prior to visit.     Immunization History   Administered Date(s) Administered    DT (pediatric) 08/21/2023    Flu vaccine, quadrivalent, high-dose, preservative free, age 65y+ (FLUZONE) 09/19/2021, 10/05/2022, 09/08/2023    Influenza, High Dose Seasonal, Preservative Free 12/10/2019, 09/28/2020    Moderna COVID-19 vaccine, bivalent, blue cap/gray label *Check age/dose* 10/09/2022    Moderna SARS-CoV-2 Vaccination 01/22/2021, 02/19/2021, 11/13/2021    Pneumococcal conjugate vaccine, 13-valent (PREVNAR 13) 12/05/2018    Pneumococcal polysaccharide vaccine, 23-valent, age 2 years and older (PNEUMOVAX 23) 10/14/2004, 08/01/2012, 09/28/2020    Tdap vaccine, age 7 year and older (BOOSTRIX, ADACEL) 09/28/2020    Zoster vaccine, recombinant, adult (SHINGRIX) 03/02/2021    Zoster, live 02/01/2013     Patient's medical history was reviewed and updated either before or during this encounter.       Rg Traore MD

## 2024-06-04 ENCOUNTER — TREATMENT (OUTPATIENT)
Dept: PHYSICAL THERAPY | Facility: CLINIC | Age: 84
End: 2024-06-04
Payer: MEDICARE

## 2024-06-04 DIAGNOSIS — F41.9 ANXIETY: ICD-10-CM

## 2024-06-04 DIAGNOSIS — F32.A DEPRESSION, UNSPECIFIED DEPRESSION TYPE: ICD-10-CM

## 2024-06-04 DIAGNOSIS — M48.062 SPINAL STENOSIS, LUMBAR REGION WITH NEUROGENIC CLAUDICATION: ICD-10-CM

## 2024-06-04 PROCEDURE — 97014 ELECTRIC STIMULATION THERAPY: CPT | Mod: GP

## 2024-06-04 PROCEDURE — 97012 MECHANICAL TRACTION THERAPY: CPT | Mod: GP

## 2024-06-04 PROCEDURE — 97110 THERAPEUTIC EXERCISES: CPT | Mod: GP

## 2024-06-04 ASSESSMENT — PAIN SCALES - GENERAL: PAINLEVEL_OUTOF10: 3

## 2024-06-04 ASSESSMENT — PAIN - FUNCTIONAL ASSESSMENT: PAIN_FUNCTIONAL_ASSESSMENT: 0-10

## 2024-06-04 NOTE — PROGRESS NOTES
Physical Therapy Treatment    Patient Name: Sheila Sanchez  MRN: [id number]  PT Received on: 6/4/2024    Time Calculation  Start Time: 1447  Stop Time: 1535  Time Calculation (min): 48 min  PT Modalities Time Entry  E-Stim (Unattended) Time Entry: 15  Mechanical Traction Time Entry: 12  PT Therapeutic Procedures Time Entry  Therapeutic Exercise Time Entry: 12    Visit # 3 of 30  Visits/Dates Authorized: [place]    Current Problem:   Problem List Items Addressed This Visit             ICD-10-CM    Spinal stenosis, lumbar region with neurogenic claudication M48.062     Precautions:    N/A    Subjective   General:    Patient reports good compliance with HEP, but that her back is so painful first thing in the mornings, she can hardly stand up. Symptoms ease up after a while of standing and moving around.     Pre-Treatment Symptoms:   Pain Assessment: 0-10  Pain Score: 3    Objective   Findings:   Lumbar extension and standing/walking intolerance with radiating R>L LE pain. BLE neuropathy and general posture and strength deficits contribute to decreased balance.     Treatments:  Therapeutic Exercise:   Nustep L5 6 min  HEP review    DNP  Tband sidesteps and monsters- orange  Lean on counter hip x12- orange  Piriformis stretch 2x30s  LTR x20  PPT + progressions: marches, hip add, knee fall- a lot of cueing x15 ea  Bridges with sm ball btwn knees x15  H/L hip abd- green x15    Neuromuscular Re-Ed:       Therapeutic Activity:      Gait Training:       Manual Therapy:       Modalities:   Unattended e-stim: IFC: applied to bilateral lumbar paraspinals, Intensity to tolerance, for 15 minutes, in Supine , with wedge with MHP      Mechanical Lumbar Traction: Intermittent: 55 lbs and 40 seconds on, 30 lbs and 15 seconds off, for 12 minutes in Supine, with wedge     Assessment:    Trialed mechanical lumbar traction prior to MHP+IFC, tolerated well. Reviewed HEP and discussed incorporating light LTR and SKTC stretching prior to  standing up in the morning.     Post-Treatment Symptoms:   Response to Interventions: Felt better    Plan:    Cont to progress neutral spine strengthening and increase tolerance to extension to improve standing tolerance and ambulation tolerance.     Goals:   Active       PT Problem       STGs        Start:  05/08/24    Expected End:  05/22/24       Independent with HEP.         LTGs       Start:  05/08/24    Expected End:  08/06/24       1) Pt will report pain levels no greater than 3/10 at worst with activity for less difficulty standing, walking, bending, and lifting from the floor.  2) Pt will display improved pain-free lumbar spine AROM WFL for less difficulty bending, lifting, transferring and performing normal house and yard work activities.  3) Pt will be able to ambulate household and community distances comfortably without AD.  4) Pt will score <20% impairment on Low Back Disability Questionnaire to indicate significant improvement in low back function.  5) Pt will improve bilateral hip and knee strength to at least 4+/5 for all major muscle groups for improved functional strength with transferring, stairs, and general mobility.

## 2024-06-05 PROCEDURE — RXMED WILLOW AMBULATORY MEDICATION CHARGE

## 2024-06-05 RX ORDER — ZOLPIDEM TARTRATE 5 MG/1
5 TABLET ORAL NIGHTLY PRN
Qty: 90 TABLET | Refills: 1 | Status: SHIPPED | OUTPATIENT
Start: 2024-06-05

## 2024-06-10 ENCOUNTER — PHARMACY VISIT (OUTPATIENT)
Dept: PHARMACY | Facility: CLINIC | Age: 84
End: 2024-06-10
Payer: COMMERCIAL

## 2024-06-10 ENCOUNTER — TREATMENT (OUTPATIENT)
Dept: PHYSICAL THERAPY | Facility: CLINIC | Age: 84
End: 2024-06-10
Payer: MEDICARE

## 2024-06-10 DIAGNOSIS — M48.062 SPINAL STENOSIS, LUMBAR REGION WITH NEUROGENIC CLAUDICATION: ICD-10-CM

## 2024-06-10 PROCEDURE — 97014 ELECTRIC STIMULATION THERAPY: CPT | Mod: GP

## 2024-06-10 PROCEDURE — 97110 THERAPEUTIC EXERCISES: CPT | Mod: GP

## 2024-06-10 NOTE — PROGRESS NOTES
"Physical Therapy Treatment    Patient Name: Sheila Sanchez  MRN: [id number]  PT Received on: 6/10/2024    Time Calculation  Start Time: 1440  Stop Time: 1525  Time Calculation (min): 45 min  PT Modalities Time Entry  E-Stim (Unattended) Time Entry: 12  PT Therapeutic Procedures Time Entry  Therapeutic Exercise Time Entry: 28    Visit # 4 of 30  Visits/Dates Authorized: [place]    Current Problem:   Problem List Items Addressed This Visit             ICD-10-CM    Spinal stenosis, lumbar region with neurogenic claudication M48.062     Precautions:    N/A    Subjective   General:    Patient reports she's in a lot of pain in the mornings. Things seem to loosen up as she gets up and moves around. She has visit scheduled with Dr. Anderson on 6/12/24.     Pre-Treatment Symptoms:   Pain Assessment: 0-10  Pain Score: 5 - Moderate pain    Objective   Findings:   Lumbar extension and standing/walking intolerance with radiating R>L LE pain. BLE neuropathy and general posture and strength deficits contribute to decreased balance.     Treatments:  Therapeutic Exercise:   Nustep L5 6 min DNP  Piriformis and hamstring stretching 2x30s  Tball DKTC 2x10  Tball bridge x10  HL hip add/abd boni 10x5\" each  Supine SLR x10  S/L clams  Extensive HEP review and update    DNP  Tband sidesteps and monsters- orange  Lean on counter hip x12- orange  LTR x20  Bridges with sm ball btwn knees x15  H/L hip abd- green x15    Manual Therapy:     Modalities:   Dry needling following informed consent: with e-stim; 5Hz, mA to tolerance, applied to R L2-L5 posterior cunatenous, R cluneals and piri, for 12 minutes. In L sidelying with pillow between knees    DNP  Unattended e-stim: IFC: applied to bilateral lumbar paraspinals, Intensity to tolerance, for 15 minutes, in Supine , with wedge with MHP      Mechanical Lumbar Traction: Intermittent: 55 lbs and 40 seconds on, 30 lbs and 15 seconds off, for 12 minutes in Supine, with wedge     Assessment:    Resumed " ther ex for core/hip strengthening, heavy cues for proper core co-contraction and to not hold breath. Trialed DN+Stim at conclusion of session, to R lummain mm, with patient report of decreased pain with standing/walking immediately afterwards.    Post-Treatment Symptoms:   Response to Interventions: Decreased pain after DN    Plan:    Cont to progress neutral spine strengthening and increase tolerance to extension to improve standing tolerance and ambulation tolerance. Modalities / DN if indicated.    Goals:   Active       PT Problem       STGs        Start:  05/08/24    Expected End:  05/22/24       Independent with HEP.         LTGs       Start:  05/08/24    Expected End:  08/06/24       1) Pt will report pain levels no greater than 3/10 at worst with activity for less difficulty standing, walking, bending, and lifting from the floor.  2) Pt will display improved pain-free lumbar spine AROM WFL for less difficulty bending, lifting, transferring and performing normal house and yard work activities.  3) Pt will be able to ambulate household and community distances comfortably without AD.  4) Pt will score <20% impairment on Low Back Disability Questionnaire to indicate significant improvement in low back function.  5) Pt will improve bilateral hip and knee strength to at least 4+/5 for all major muscle groups for improved functional strength with transferring, stairs, and general mobility.

## 2024-06-11 ASSESSMENT — PAIN - FUNCTIONAL ASSESSMENT: PAIN_FUNCTIONAL_ASSESSMENT: 0-10

## 2024-06-11 ASSESSMENT — PAIN SCALES - GENERAL: PAINLEVEL_OUTOF10: 5 - MODERATE PAIN

## 2024-06-17 ENCOUNTER — PHARMACY VISIT (OUTPATIENT)
Dept: PHARMACY | Facility: CLINIC | Age: 84
End: 2024-06-17
Payer: COMMERCIAL

## 2024-06-17 ENCOUNTER — TREATMENT (OUTPATIENT)
Dept: PHYSICAL THERAPY | Facility: CLINIC | Age: 84
End: 2024-06-17
Payer: MEDICARE

## 2024-06-17 DIAGNOSIS — M48.062 SPINAL STENOSIS, LUMBAR REGION WITH NEUROGENIC CLAUDICATION: ICD-10-CM

## 2024-06-17 PROCEDURE — 97014 ELECTRIC STIMULATION THERAPY: CPT | Mod: GP

## 2024-06-17 PROCEDURE — RXMED WILLOW AMBULATORY MEDICATION CHARGE

## 2024-06-17 PROCEDURE — 97110 THERAPEUTIC EXERCISES: CPT | Mod: GP

## 2024-06-17 ASSESSMENT — PAIN - FUNCTIONAL ASSESSMENT: PAIN_FUNCTIONAL_ASSESSMENT: 0-10

## 2024-06-17 ASSESSMENT — PAIN SCALES - GENERAL: PAINLEVEL_OUTOF10: 4

## 2024-06-17 NOTE — PROGRESS NOTES
Physical Therapy Treatment    Patient Name: Sheila Sanchez  MRN: [id number]  PT Received on: 6/17/2024    Time Calculation  Start Time: 1350  Stop Time: 1432  Time Calculation (min): 42 min  PT Modalities Time Entry  E-Stim (Unattended) Time Entry: 12  PT Therapeutic Procedures Time Entry  Therapeutic Exercise Time Entry: 28    Visit # 5 of 8  Visits/Dates Authorized: [place]    Current Problem:   Problem List Items Addressed This Visit             ICD-10-CM    Spinal stenosis, lumbar region with neurogenic claudication M48.062     Precautions:    N/A    Subjective   General:    Patient reports she has good and bad days, but overall feels like there was a noticeable improvement in symptoms since previous session in which DN was trialed.     Pre-Treatment Symptoms:   Pain Assessment: 0-10  Pain Score: 4    Objective   Findings:   Lumbar extension and standing/walking intolerance with radiating R>L LE pain. BLE neuropathy and general posture and strength deficits contribute to decreased balance.     Treatments:  Therapeutic Exercise:   Nustep L4 - 5 min  HSC 35# 2x10  Seated leg press 0-20# 3x10  Piriformis and hamstring stretching 2x30s  Tball DKTC 2x15  Tball bridge 2x10  HL hip add with PPT x15  HL hip abd light band x15    DNP  Supine SLR x10  S/L clams  Tband sidesteps and monsters- orange  Lean on counter hip x12- orange  LTR x20  Bridges with sm ball btwn knees x15    Manual Therapy:     Modalities:   Dry needling following informed consent: with e-stim; 5Hz, mA to tolerance, applied to L/R L2-L5 posterior cunatenous, R/L cluneals, for 12 minutes. In R sidelying with pillow between knees    DNP  Unattended e-stim: IFC: applied to bilateral lumbar paraspinals, Intensity to tolerance, for 15 minutes, in Supine , with wedge with MHP      Mechanical Lumbar Traction: Intermittent: 55 lbs and 40 seconds on, 30 lbs and 15 seconds off, for 12 minutes in Supine, with wedge     Assessment:    Continued mat ther ex for  core/hip strengthening, and added LE strengthening with HSC and seated leg press, tolerated well. Continued DN+stim in sidelying due to positive symptom response following previous session.     Post-Treatment Symptoms:   Response to Interventions: Felt good immediately after DN    Plan:    Cont to progress neutral spine strengthening and increase tolerance to extension to improve standing tolerance and ambulation tolerance. Modalities / DN if indicated.    Goals:   Active       PT Problem       STGs        Start:  05/08/24    Expected End:  05/22/24       Independent with HEP.         LTGs       Start:  05/08/24    Expected End:  08/06/24       1) Pt will report pain levels no greater than 3/10 at worst with activity for less difficulty standing, walking, bending, and lifting from the floor.  2) Pt will display improved pain-free lumbar spine AROM WFL for less difficulty bending, lifting, transferring and performing normal house and yard work activities.  3) Pt will be able to ambulate household and community distances comfortably without AD.  4) Pt will score <20% impairment on Low Back Disability Questionnaire to indicate significant improvement in low back function.  5) Pt will improve bilateral hip and knee strength to at least 4+/5 for all major muscle groups for improved functional strength with transferring, stairs, and general mobility.

## 2024-06-24 ENCOUNTER — TREATMENT (OUTPATIENT)
Dept: PHYSICAL THERAPY | Facility: CLINIC | Age: 84
End: 2024-06-24
Payer: MEDICARE

## 2024-06-24 DIAGNOSIS — M48.062 SPINAL STENOSIS, LUMBAR REGION WITH NEUROGENIC CLAUDICATION: ICD-10-CM

## 2024-06-24 PROCEDURE — 97140 MANUAL THERAPY 1/> REGIONS: CPT | Mod: GP

## 2024-06-24 PROCEDURE — 97110 THERAPEUTIC EXERCISES: CPT | Mod: GP

## 2024-06-24 PROCEDURE — 97014 ELECTRIC STIMULATION THERAPY: CPT | Mod: GP

## 2024-06-24 ASSESSMENT — PAIN - FUNCTIONAL ASSESSMENT: PAIN_FUNCTIONAL_ASSESSMENT: 0-10

## 2024-06-24 ASSESSMENT — PAIN SCALES - GENERAL: PAINLEVEL_OUTOF10: 5 - MODERATE PAIN

## 2024-06-24 NOTE — PROGRESS NOTES
Physical Therapy Treatment    Patient Name: Sheila Sanchez  MRN: [id number]  PT Received on: 6/24/2024    Time Calculation  Start Time: 1347  Stop Time: 1435  Time Calculation (min): 48 min  PT Modalities Time Entry  E-Stim (Unattended) Time Entry: 15  PT Therapeutic Procedures Time Entry  Manual Therapy Time Entry: 15  Therapeutic Exercise Time Entry: 10    Visit # 6 of 8  Visits/Dates Authorized: [place]    Current Problem:   Problem List Items Addressed This Visit             ICD-10-CM    Spinal stenosis, lumbar region with neurogenic claudication M48.062     Precautions:    N/A    Subjective   General:    Patient reports continued back pain. Not sure if DN helped as much last time as it did the first time. Does report the number of good days to bad days as improved since beginning therapy.     Pre-Treatment Symptoms:   Pain Assessment: 0-10  0-10 (Numeric) Pain Score: 5 - Moderate pain    Objective   Findings:   Lumbar extension and standing/walking intolerance with radiating R>L LE pain. BLE neuropathy and general posture and strength deficits contribute to decreased balance.     Treatments:  Therapeutic Exercise:   Nustep L5 - 7 min  Review of HEP and symptom response to activity    DNP  HSC 35# 2x10  Seated leg press 0-20# 3x10  Piriformis and hamstring stretching 2x30s  Tball DKTC 2x15  Tball bridge 2x10  HL hip add with PPT x15  HL hip abd light band x15    Supine SLR x10  S/L clams  Tband sidesteps and monsters- orange  Lean on counter hip x12- orange  LTR x20  Bridges with sm ball btwn knees x15    Manual Therapy:   MFR/STM R/L lumbosacral paraspinals in R sidelyingt    Modalities:   Unattended e-stim: TENS: applied to bilateral lumbar paraspinals and cluneals, Intensity to tolerance, for 15 minutes, in Supine , with wedge with MHP      DNP  Dry needling following informed consent: with e-stim; 5Hz, mA to tolerance, applied to L/R L2-L5 posterior cunatenous, R/L cluneals, for 12 minutes. In R sidelying with  pillow between knees    Mechanical Lumbar Traction: Intermittent: 55 lbs and 40 seconds on, 30 lbs and 15 seconds off, for 12 minutes in Supine, with wedge     Assessment:    MFR/STM to lumbosacral and glutes in S/L, very TTP on R glute. Concluded with TENS + MHP for pain modulation.     Post-Treatment Symptoms:   Response to Interventions: Felt better    Plan:    Cont to progress neutral spine strengthening and increase tolerance to extension to improve standing tolerance and ambulation tolerance. Modalities / DN if indicated.    Goals:   Active       PT Problem       STGs        Start:  05/08/24    Expected End:  05/22/24       Independent with HEP.         LTGs       Start:  05/08/24    Expected End:  08/06/24       1) Pt will report pain levels no greater than 3/10 at worst with activity for less difficulty standing, walking, bending, and lifting from the floor.  2) Pt will display improved pain-free lumbar spine AROM WFL for less difficulty bending, lifting, transferring and performing normal house and yard work activities.  3) Pt will be able to ambulate household and community distances comfortably without AD.  4) Pt will score <20% impairment on Low Back Disability Questionnaire to indicate significant improvement in low back function.  5) Pt will improve bilateral hip and knee strength to at least 4+/5 for all major muscle groups for improved functional strength with transferring, stairs, and general mobility.

## 2024-06-27 PROCEDURE — RXMED WILLOW AMBULATORY MEDICATION CHARGE

## 2024-06-28 ENCOUNTER — PHARMACY VISIT (OUTPATIENT)
Dept: PHARMACY | Facility: CLINIC | Age: 84
End: 2024-06-28
Payer: COMMERCIAL

## 2024-07-01 ENCOUNTER — TREATMENT (OUTPATIENT)
Dept: PHYSICAL THERAPY | Facility: CLINIC | Age: 84
End: 2024-07-01
Payer: MEDICARE

## 2024-07-01 DIAGNOSIS — M48.062 SPINAL STENOSIS, LUMBAR REGION WITH NEUROGENIC CLAUDICATION: ICD-10-CM

## 2024-07-01 PROCEDURE — 97110 THERAPEUTIC EXERCISES: CPT | Mod: GP

## 2024-07-01 PROCEDURE — 97140 MANUAL THERAPY 1/> REGIONS: CPT | Mod: GP

## 2024-07-01 NOTE — PROGRESS NOTES
Physical Therapy Treatment    Patient Name: Sheila Sanchez  MRN: [id number]  PT Received on: 7/1/2024    Time Calculation  Start Time: 1350  Stop Time: 1430  Time Calculation (min): 40 min  PT Therapeutic Procedures Time Entry  Manual Therapy Time Entry: 15  Therapeutic Exercise Time Entry: 25    Visit # 7 of 8  Visits/Dates Authorized: [place]    Current Problem:   Problem List Items Addressed This Visit             ICD-10-CM    Spinal stenosis, lumbar region with neurogenic claudication M48.062     Precautions:    N/A    Subjective   General:    Patient reports mornings are the worst and she has trouble getting up and getting moving. She also has great difficulty standing at the kitchen sink, and finds that she has increased RLE radiating pain with turning to the right instead of turning to the left.    Pre-Treatment Symptoms:   Pain Assessment: 0-10  0-10 (Numeric) Pain Score: 5 - Moderate pain    Objective   Findings:   Lumbar extension and standing/walking intolerance with radiating R>L LE pain. BLE neuropathy and general posture and strength deficits contribute to decreased balance.     Treatments:  Therapeutic Exercise:   LTR   Tball DKTC x20  TA + alt march   PPT  HL green clam x20  Standing FT palloff press 5# R/L   Seated trunk twists    DNP  Tball bridge 2x10  Nustep L5 - 7 min  Review of HEP and symptom response to activity  HSC 35# 2x10  Seated leg press 0-20# 3x10  Piriformis and hamstring stretching 2x30s  HL hip add with PPT x15  HL hip abd light band x15  Tband sidesteps and monsters- orange  Lean on counter hip x12- orange  Bridges with sm ball btwn knees x15    Manual Therapy:   MFR/STM R/L lumbosacral paraspinals in R sidelying    Modalities: DNP  Unattended e-stim: TENS: applied to bilateral lumbar paraspinals and cluneals, Intensity to tolerance, for 15 minutes, in Supine , with wedge with MHP      Dry needling following informed consent: with e-stim; 5Hz, mA to tolerance, applied to L/R L2-L5  posterior cunatenous, R/L cluneals, for 12 minutes. In R sidelying with pillow between knees    Mechanical Lumbar Traction: Intermittent: 55 lbs and 40 seconds on, 30 lbs and 15 seconds off, for 12 minutes in Supine, with wedge     Assessment:    Incorporated standing/seated ther ex for core engagement with weight bearing. Patient was challenged with standing palloff press. Continued MFR/STM to R/L lumbosacral and glutes in R S/L, tolerated well and reported decreased pain and stiffness following.     Post-Treatment Symptoms:   Response to Interventions: Felt better    Plan:    Cont to progress neutral spine strengthening and increase tolerance to extension to improve standing tolerance and ambulation tolerance. Modalities / DN if indicated.    Goals:   Active       PT Problem       STGs        Start:  05/08/24    Expected End:  05/22/24       Independent with HEP.         LTGs       Start:  05/08/24    Expected End:  08/06/24       1) Pt will report pain levels no greater than 3/10 at worst with activity for less difficulty standing, walking, bending, and lifting from the floor.  2) Pt will display improved pain-free lumbar spine AROM WFL for less difficulty bending, lifting, transferring and performing normal house and yard work activities.  3) Pt will be able to ambulate household and community distances comfortably without AD.  4) Pt will score <20% impairment on Low Back Disability Questionnaire to indicate significant improvement in low back function.  5) Pt will improve bilateral hip and knee strength to at least 4+/5 for all major muscle groups for improved functional strength with transferring, stairs, and general mobility.

## 2024-07-02 ASSESSMENT — PAIN SCALES - GENERAL: PAINLEVEL_OUTOF10: 5 - MODERATE PAIN

## 2024-07-02 ASSESSMENT — PAIN - FUNCTIONAL ASSESSMENT: PAIN_FUNCTIONAL_ASSESSMENT: 0-10

## 2024-07-05 ENCOUNTER — APPOINTMENT (OUTPATIENT)
Dept: PRIMARY CARE | Facility: CLINIC | Age: 84
End: 2024-07-05
Payer: MEDICARE

## 2024-07-08 ENCOUNTER — TREATMENT (OUTPATIENT)
Dept: PHYSICAL THERAPY | Facility: CLINIC | Age: 84
End: 2024-07-08
Payer: MEDICARE

## 2024-07-08 ENCOUNTER — PHARMACY VISIT (OUTPATIENT)
Dept: PHARMACY | Facility: CLINIC | Age: 84
End: 2024-07-08
Payer: COMMERCIAL

## 2024-07-08 DIAGNOSIS — M48.062 SPINAL STENOSIS, LUMBAR REGION WITH NEUROGENIC CLAUDICATION: ICD-10-CM

## 2024-07-08 PROCEDURE — 97110 THERAPEUTIC EXERCISES: CPT | Mod: GP

## 2024-07-08 PROCEDURE — 97140 MANUAL THERAPY 1/> REGIONS: CPT | Mod: GP

## 2024-07-08 PROCEDURE — RXMED WILLOW AMBULATORY MEDICATION CHARGE

## 2024-07-08 NOTE — PROGRESS NOTES
Physical Therapy Treatment    Patient Name: Sheila Sanchez  MRN: [id number]  PT Received on: 7/8/2024    Time Calculation  Start Time: 1350  Stop Time: 1430  Time Calculation (min): 40 min  PT Therapeutic Procedures Time Entry  Manual Therapy Time Entry: 14  Therapeutic Exercise Time Entry: 24    Visit # 8 of 8 (PN next visit)  Visits/Dates Authorized: [place]    Current Problem:   Problem List Items Addressed This Visit             ICD-10-CM    Spinal stenosis, lumbar region with neurogenic claudication M48.062     Precautions:    N/A    Subjective   General:    Patient reports symptoms are less in general since previous visit. Seemed to have less average pain when first getting up in the morning and with daily activity since then.    Pre-Treatment Symptoms:   Pain Assessment: 0-10  0-10 (Numeric) Pain Score: 4    Objective   Findings:   Lumbar extension and standing/walking intolerance with radiating R>L LE pain. BLE neuropathy and general posture and strength deficits contribute to decreased balance.     Treatments:  Therapeutic Exercise:   LTR   Tball DKTC x20  TA + alt march   PPT  HL green clam x20    DNP  Standing FT palloff press 5# R/L   Seated trunk twists  Tball bridge 2x10  Nustep L5 - 7 min  Review of HEP and symptom response to activity  HSC 35# 2x10  Seated leg press 0-20# 3x10  Piriformis and hamstring stretching 2x30s  HL hip add with PPT x15  HL hip abd light band x15  Tband sidesteps and monsters- orange  Lean on counter hip x12- orange  Bridges with sm ball btwn knees x15    Manual Therapy:   MFR/STM R/L lumbosacral paraspinals in R sidelying    Modalities: DNP  Unattended e-stim: TENS: applied to bilateral lumbar paraspinals and cluneals, Intensity to tolerance, for 15 minutes, in Supine , with wedge with MHP      Dry needling following informed consent: with e-stim; 5Hz, mA to tolerance, applied to L/R L2-L5 posterior cunatenous, R/L cluneals, for 12 minutes. In R sidelying with pillow between  knees    Mechanical Lumbar Traction: Intermittent: 55 lbs and 40 seconds on, 30 lbs and 15 seconds off, for 12 minutes in Supine, with wedge     Assessment:    Continued mat core/hip strengthening and MFR/STM of lumbar paraspinals due to positive symptom response to previous visit's interventions. Plan for PN next visit.    Post-Treatment Symptoms:   Response to Interventions: No worse    Plan:    Cont to progress neutral spine strengthening and increase tolerance to extension to improve standing tolerance and ambulation tolerance. Modalities / DN if indicated.    Goals:   Active       PT Problem       STGs        Start:  05/08/24    Expected End:  05/22/24       Independent with HEP.         LTGs       Start:  05/08/24    Expected End:  08/06/24       1) Pt will report pain levels no greater than 3/10 at worst with activity for less difficulty standing, walking, bending, and lifting from the floor.  2) Pt will display improved pain-free lumbar spine AROM WFL for less difficulty bending, lifting, transferring and performing normal house and yard work activities.  3) Pt will be able to ambulate household and community distances comfortably without AD.  4) Pt will score <20% impairment on Low Back Disability Questionnaire to indicate significant improvement in low back function.  5) Pt will improve bilateral hip and knee strength to at least 4+/5 for all major muscle groups for improved functional strength with transferring, stairs, and general mobility.

## 2024-07-09 ASSESSMENT — PAIN - FUNCTIONAL ASSESSMENT: PAIN_FUNCTIONAL_ASSESSMENT: 0-10

## 2024-07-09 ASSESSMENT — PAIN SCALES - GENERAL: PAINLEVEL_OUTOF10: 4

## 2024-07-10 ENCOUNTER — OFFICE VISIT (OUTPATIENT)
Dept: RHEUMATOLOGY | Facility: CLINIC | Age: 84
End: 2024-07-10
Payer: MEDICARE

## 2024-07-10 ENCOUNTER — LAB (OUTPATIENT)
Dept: LAB | Facility: LAB | Age: 84
End: 2024-07-10
Payer: MEDICARE

## 2024-07-10 VITALS — DIASTOLIC BLOOD PRESSURE: 70 MMHG | WEIGHT: 143 LBS | SYSTOLIC BLOOD PRESSURE: 126 MMHG | BODY MASS INDEX: 27.93 KG/M2

## 2024-07-10 DIAGNOSIS — M31.6 TEMPORAL ARTERITIS (MULTI): Primary | ICD-10-CM

## 2024-07-10 DIAGNOSIS — E55.9 VITAMIN D DEFICIENCY: ICD-10-CM

## 2024-07-10 DIAGNOSIS — M31.6 TEMPORAL ARTERITIS (MULTI): ICD-10-CM

## 2024-07-10 PROBLEM — M81.8 OTHER OSTEOPOROSIS WITHOUT CURRENT PATHOLOGICAL FRACTURE: Status: ACTIVE | Noted: 2024-07-10

## 2024-07-10 LAB
25(OH)D3 SERPL-MCNC: 29 NG/ML (ref 30–100)
CA-I BLD-SCNC: 1.27 MMOL/L (ref 1.1–1.33)
CREAT SERPL-MCNC: 1.38 MG/DL (ref 0.5–1.05)
CRP SERPL-MCNC: <0.1 MG/DL
EGFRCR SERPLBLD CKD-EPI 2021: 38 ML/MIN/1.73M*2

## 2024-07-10 PROCEDURE — 86140 C-REACTIVE PROTEIN: CPT

## 2024-07-10 PROCEDURE — 99214 OFFICE O/P EST MOD 30 MIN: CPT | Performed by: INTERNAL MEDICINE

## 2024-07-10 PROCEDURE — 36415 COLL VENOUS BLD VENIPUNCTURE: CPT

## 2024-07-10 PROCEDURE — 1159F MED LIST DOCD IN RCRD: CPT | Performed by: INTERNAL MEDICINE

## 2024-07-10 PROCEDURE — 82330 ASSAY OF CALCIUM: CPT

## 2024-07-10 PROCEDURE — 3078F DIAST BP <80 MM HG: CPT | Performed by: INTERNAL MEDICINE

## 2024-07-10 PROCEDURE — 82565 ASSAY OF CREATININE: CPT

## 2024-07-10 PROCEDURE — 3074F SYST BP LT 130 MM HG: CPT | Performed by: INTERNAL MEDICINE

## 2024-07-10 PROCEDURE — 82306 VITAMIN D 25 HYDROXY: CPT

## 2024-07-10 NOTE — PROGRESS NOTES
"Recheck  GCA   doing well on prednisone 5 mg daily.  C/O \" my eyesight doesn't seem to be as sharp as it used to be\"  Eval with Dr. Winston couple months ago with normal exam.    HPI - \"I'm not 100% happy\" with her vision.  She saw ophtho.  Her vision \"isn't as sharp as I'd like it to be\"  She has had \"a few nasty HA\" that lasted \"a day, maybe\" - she doesn't take anything.  It's always in the L temple area.  No tongue/jaw gareth.  She had a bridge put in since last OV.  No CP, resp, or GI.  She sees pain mgmt and had injections for spinal stenosis, which didn't help.  She is in PT, which is helping.  Her back is sore when she gets up in AM that improves with moving around.  No AM stiffness.  L ankles swelling.      PE  NAD  Good TA pulses, NT, no head/neck bruits  II/VI syst murmur  CTA  2+ DP, PT, and rad pulses  No edema  No synovitis    A/P - GCA per prev rheum, although TA bx L was neg, R not available, CRP always nl.  She had diverticulitis on actemra but has been doing well with pred  Heart murmur - she says this is new, and nobody ever mentioned one prior to me mentioning at last OV.  Recommend cardiac eval, but she declined  OP - had prolia x2 but too expensive.  Unable to raina oral bisphosphonates.  Reclast if covered - expl risks/benefits  Check labs  Reeval 3 mo  "

## 2024-07-15 ENCOUNTER — TREATMENT (OUTPATIENT)
Dept: PHYSICAL THERAPY | Facility: CLINIC | Age: 84
End: 2024-07-15
Payer: MEDICARE

## 2024-07-15 ENCOUNTER — TELEPHONE (OUTPATIENT)
Dept: PRIMARY CARE | Facility: CLINIC | Age: 84
End: 2024-07-15

## 2024-07-15 DIAGNOSIS — M48.062 SPINAL STENOSIS, LUMBAR REGION WITH NEUROGENIC CLAUDICATION: ICD-10-CM

## 2024-07-15 PROCEDURE — 97014 ELECTRIC STIMULATION THERAPY: CPT | Mod: GP

## 2024-07-15 PROCEDURE — 97140 MANUAL THERAPY 1/> REGIONS: CPT | Mod: GP

## 2024-07-15 PROCEDURE — 97110 THERAPEUTIC EXERCISES: CPT | Mod: GP

## 2024-07-15 NOTE — PROGRESS NOTES
Physical Therapy Treatment / Progress Note    Patient Name: Sheila Sanchez  MRN: [id number]  PT Received on: 7/15/2024    Time Calculation  Start Time: 1348  Stop Time: 1432  Time Calculation (min): 44 min  PT Modalities Time Entry  E-Stim (Unattended) Time Entry: 15  PT Therapeutic Procedures Time Entry  Manual Therapy Time Entry: 10  Therapeutic Exercise Time Entry: 14    Visit # 9 of 14 (PN)  Visits/Dates Authorized: [place]    Current Problem:   Problem List Items Addressed This Visit             ICD-10-CM    Spinal stenosis, lumbar region with neurogenic claudication M48.062     Precautions:    N/A    Subjective   General:    Patient reports she has good and bad days, but can't decipher what activities she does that precedes these good/bad days. Compliant with HEP, does them every morning. Typically has the most pain and difficulty first thing in the morning, also if standing up after sitting for a while.     Pre-Treatment Symptoms:   Pain Assessment: 0-10  0-10 (Numeric) Pain Score: 2    Objective   Findings:   Lumbar extension and standing/walking intolerance with radiating R>L LE pain. BLE neuropathy and general posture and strength deficits contribute to decreased balance.     Lumbar AROM: Flexion WFL, R sidebending with R sided low back pain, L sidebending limited with L sided LBP, Extension limited    MMT: R/L knees = 5/5 grossly            R/L hips = 4+/5 grossly    Pain with palpation to R>L Lumbar paraspinals and glutes    Treatments:  Therapeutic Exercise:   Assessment  HEP review    DNP  LTR   Tball DKTC x20  TA + alt march   PPT  HL green clam x20  Standing FT palloff press 5# R/L   Seated trunk twists  Tball bridge 2x10  Nustep L5 - 7 min  Review of HEP and symptom response to activity  HSC 35# 2x10  Seated leg press 0-20# 3x10  Piriformis and hamstring stretching 2x30s  HL hip add with PPT x15  HL hip abd light band x15  Tband sidesteps and monsters- orange  Lean on counter hip x12- orange  Bridges  with sm ball btwn knees x15    Manual Therapy:   MFR/STM R/L lumbosacral paraspinals in L sidelying    Modalities:   Dry needling following informed consent: with e-stim; 5Hz, mA to tolerance, applied to R L2-L5 posterior cunatenous, R/L cluneals, R distolateral HS and lateral calf for 15 minutes. In L sidelying with pillow between knees    DNP  Unattended e-stim: TENS: applied to bilateral lumbar paraspinals and cluneals, Intensity to tolerance, for 15 minutes, in Supine , with wedge with MHP      Mechanical Lumbar Traction: Intermittent: 55 lbs and 40 seconds on, 30 lbs and 15 seconds off, for 12 minutes in Supine, with wedge     Assessment:    Patient has attended 9 total therapy visits since eval on 5/8/24. She continues to have high pain levels in the mornings, but feels the home exercises seem to help loosen her back up. Also reports symptom relief with clinic intervention, MT and modalities. Patient would benefit from continued therapy, 1x/week for 6 additional visits (14 total) to address pain and soft tissue / joint mobility restrictions.    Post-Treatment Symptoms:   Response to Interventions: No worse    Plan:    Continue 1x/week for 14 total visits to progress neutral spine strengthening, and increase extension tolerance in order to improve standing and ambulation tolerance. Modalities / DN if indicated.    Goals:   Active       PT Problem       STGs  (Met)       Start:  05/08/24    Expected End:  05/22/24    Resolved:  07/16/24    Independent with HEP.         LTGs (Progressing)       Start:  05/08/24    Expected End:  08/31/24       1) Pt will report pain levels no greater than 3/10 at worst with activity for less difficulty standing, walking, bending, and lifting from the floor.  2) Pt will display improved pain-free lumbar spine AROM WFL for less difficulty bending, lifting, transferring and performing normal house and yard work activities.  3) Pt will be able to ambulate household and community  distances comfortably without AD.  4) Pt will score <20% impairment on Low Back Disability Questionnaire to indicate significant improvement in low back function.  5) Pt will improve bilateral hip and knee strength to at least 4+/5 for all major muscle groups for improved functional strength with transferring, stairs, and general mobility.        Goal Note       Progressing, 8/10 at worst  Progressing  Progressing, no AD  Not tested  Progressing

## 2024-07-16 ASSESSMENT — PAIN SCALES - GENERAL: PAINLEVEL_OUTOF10: 2

## 2024-07-16 ASSESSMENT — PAIN - FUNCTIONAL ASSESSMENT: PAIN_FUNCTIONAL_ASSESSMENT: 0-10

## 2024-07-18 NOTE — TELEPHONE ENCOUNTER
Her labs are unchanged compared to prior. No need to make any changes at this time. Continue current care.

## 2024-07-26 PROCEDURE — RXMED WILLOW AMBULATORY MEDICATION CHARGE

## 2024-07-27 ENCOUNTER — PHARMACY VISIT (OUTPATIENT)
Dept: PHARMACY | Facility: CLINIC | Age: 84
End: 2024-07-27
Payer: COMMERCIAL

## 2024-08-02 ENCOUNTER — TREATMENT (OUTPATIENT)
Dept: PHYSICAL THERAPY | Facility: CLINIC | Age: 84
End: 2024-08-02
Payer: MEDICARE

## 2024-08-02 DIAGNOSIS — M48.062 SPINAL STENOSIS, LUMBAR REGION WITH NEUROGENIC CLAUDICATION: ICD-10-CM

## 2024-08-02 PROCEDURE — 97110 THERAPEUTIC EXERCISES: CPT | Mod: GP,CQ

## 2024-08-02 PROCEDURE — 97035 APP MDLTY 1+ULTRASOUND EA 15: CPT | Mod: GP,CQ

## 2024-08-02 ASSESSMENT — PAIN SCALES - GENERAL: PAINLEVEL_OUTOF10: 2

## 2024-08-02 ASSESSMENT — PAIN - FUNCTIONAL ASSESSMENT: PAIN_FUNCTIONAL_ASSESSMENT: 0-10

## 2024-08-02 NOTE — PROGRESS NOTES
Physical Therapy Treatment / Progress Note    Patient Name: Sheila Sanchez  MRN: [id number]  PT Received on: 8/2/2024 PT Received On: 08/02/24  Time Calculation  Start Time: 0130  Stop Time: 0215  Time Calculation (min): 45 min  PT Modalities Time Entry  Ultrasound Time Entry: 10  PT Therapeutic Procedures Time Entry  Therapeutic Exercise Time Entry: 30    Visit # 10 of 14 (PN)  Visits/Dates Authorized: [place]    Current Problem:   Problem List Items Addressed This Visit             ICD-10-CM    Spinal stenosis, lumbar region with neurogenic claudication M48.062       Precautions:    N/A    Subjective   General:    Patient reports morning pain is the worst.  Overall doing better  Pre-Treatment Symptoms:   Pain Assessment: 0-10  0-10 (Numeric) Pain Score: 2    Objective   Findings:   Lumbar extension and standing/walking intolerance with radiating R>L LE pain. BLE neuropathy and general posture and strength deficits contribute to decreased balance.     Lumbar AROM: Flexion WFL, R sidebending with R sided low back pain, L sidebending limited with L sided LBP, Extension limited    MMT: R/L knees = 5/5 grossly            R/L hips = 4+/5 grossly    Pain with palpation to R>L Lumbar paraspinals and glutes    Treatments:  Therapeutic Exercise:   LTR   Tball DKTC x20  TA + alt march   PPT  HL green clam x20  Standing FT palloff press 5# R/L   Seated trunk twists  Tball bridge 2x10  Nustep L5 - 5 min  HSC 45# 2x10  Seated leg press 20# 3x10  Piriformis and hamstring stretching 2x30s  HL hip add with PPT x15  HL hip abd light band x15  Tband sidesteps and monsters- orange  Lean on counter hip x12- orange  Bridges with sm ball btwn knees x15      Trial US vs manual 1.5 w cm2 8 min left sidelying continuous    Manual Therapy: DNP  MFR/STM R/L lumbosacral paraspinals in L sidelying    Modalities: DNP  Dry needling following informed consent: with e-stim; 5Hz, mA to tolerance, applied to R L2-L5 posterior cunatenous, R/L cluneals, R  distolateral HS and lateral calf for 15 minutes. In L sidelying with pillow between knees    DNP  Unattended e-stim: TENS: applied to bilateral lumbar paraspinals and cluneals, Intensity to tolerance, for 15 minutes, in Supine , with wedge with MHP    Mechanical Lumbar Traction: Intermittent: 55 lbs and 40 seconds on, 30 lbs and 15 seconds off, for 12 minutes in Supine, with wedge  DNP    Assessment:     Did well with session 8/2/2024.  Needs cues to stay on task. Stiffness in morning biggest complaint.    Last appointment   Patient has attended 9 total therapy visits since eval on 5/8/24. She continues to have high pain levels in the mornings, but feels the home exercises seem to help loosen her back up. Also reports symptom relief with clinic intervention, MT and modalities. Patient would benefit from continued therapy, 1x/week for 6 additional visits (14 total) to address pain and soft tissue / joint mobility restrictions.    Post-Treatment Symptoms:   Response to Interventions: same    Plan:    Continue 1x/week for 14 total visits to progress neutral spine strengthening, and increase extension tolerance in order to improve standing and ambulation tolerance. Modalities / DN if indicated.    Goals:   Active       PT Problem       STGs  (Met)       Start:  05/08/24    Expected End:  05/22/24    Resolved:  07/16/24    Independent with HEP.         LTGs (Progressing)       Start:  05/08/24    Expected End:  08/31/24       1) Pt will report pain levels no greater than 3/10 at worst with activity for less difficulty standing, walking, bending, and lifting from the floor.  2) Pt will display improved pain-free lumbar spine AROM WFL for less difficulty bending, lifting, transferring and performing normal house and yard work activities.  3) Pt will be able to ambulate household and community distances comfortably without AD.  4) Pt will score <20% impairment on Low Back Disability Questionnaire to indicate significant  improvement in low back function.  5) Pt will improve bilateral hip and knee strength to at least 4+/5 for all major muscle groups for improved functional strength with transferring, stairs, and general mobility.        Goal Note       Progressing, 8/10 at worst  Progressing  Progressing, no AD  Not tested  Progressing

## 2024-08-07 ENCOUNTER — TREATMENT (OUTPATIENT)
Dept: PHYSICAL THERAPY | Facility: CLINIC | Age: 84
End: 2024-08-07
Payer: MEDICARE

## 2024-08-07 DIAGNOSIS — M48.062 SPINAL STENOSIS, LUMBAR REGION WITH NEUROGENIC CLAUDICATION: ICD-10-CM

## 2024-08-07 PROCEDURE — 97110 THERAPEUTIC EXERCISES: CPT | Mod: GP

## 2024-08-07 ASSESSMENT — PAIN SCALES - GENERAL: PAINLEVEL_OUTOF10: 2

## 2024-08-07 ASSESSMENT — PAIN - FUNCTIONAL ASSESSMENT: PAIN_FUNCTIONAL_ASSESSMENT: 0-10
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
## 2024-08-14 ENCOUNTER — TREATMENT (OUTPATIENT)
Dept: PHYSICAL THERAPY | Facility: CLINIC | Age: 84
End: 2024-08-14
Payer: MEDICARE

## 2024-08-14 ENCOUNTER — PHARMACY VISIT (OUTPATIENT)
Dept: PHARMACY | Facility: CLINIC | Age: 84
End: 2024-08-14
Payer: COMMERCIAL

## 2024-08-14 DIAGNOSIS — M48.062 SPINAL STENOSIS, LUMBAR REGION WITH NEUROGENIC CLAUDICATION: ICD-10-CM

## 2024-08-14 PROCEDURE — 97110 THERAPEUTIC EXERCISES: CPT | Mod: GP

## 2024-08-14 PROCEDURE — 97014 ELECTRIC STIMULATION THERAPY: CPT | Mod: GP

## 2024-08-14 PROCEDURE — 97026 INFRARED THERAPY: CPT | Mod: GP

## 2024-08-14 PROCEDURE — RXMED WILLOW AMBULATORY MEDICATION CHARGE

## 2024-08-14 NOTE — PROGRESS NOTES
Physical Therapy Treatment    Patient Name: Sheila Sanchez  MRN: [id number]  PT Received on: 8/14/2024    Time Calculation  Start Time: 1025  Stop Time: 1110  Time Calculation (min): 45 min  PT Modalities Time Entry  E-Stim (Unattended) Time Entry: 10  Infrared Time Entry: 10  PT Therapeutic Procedures Time Entry  Therapeutic Exercise Time Entry: 20    Visit # 12 of 14  Visits/Dates Authorized: [place]    Current Problem:   Problem List Items Addressed This Visit             ICD-10-CM    Spinal stenosis, lumbar region with neurogenic claudication M48.062       Precautions:    N/A    Subjective   General:    Patient fell backward 2 days ago, landing on R lower rib and buttock region. R lower ribs are very painful to palpation, and patient holds RUE in guarded position. Didn't want to go to ER because she thinks she bruised or cracked her ribs, and it wouldn't be worth getting checked.     Pre-Treatment Symptoms:    Moderate/severe pain R lower ribs    Objective   Findings:   8/14/24: Very TTP to R posterolateral lower ribs, no bruising or gross deformity noted.    Previously noted:  Lumbar extension and standing/walking intolerance with radiating R>L LE pain. BLE neuropathy and general posture and strength deficits contribute to decreased balance.     Lumbar AROM: Flexion WFL, R sidebending with R sided low back pain, L sidebending limited with L sided LBP, Extension limited    MMT: R/L knees = 5/5 grossly            R/L hips = 4+/5 grossly    Treatments:  Therapeutic Exercise:   Nustep L5 - 6 min DNP  Tband sidesteps and fw monster - orange 2 laps  Lean on counter hip ext 2x12 R/L   HSC 40# 2x10  Seated leg press 20-27# 2x12    DNP  LTR on Tball  Tball bridge 2x10  Tball DKTC x20  HL medium band clam x20  3-way bridge 3x10  Standing Purple tband pallof press R/L   TA + alt march   Seated trunk twists  Piriformis and hamstring stretching 2x30s  HL hip add with PPT x15    Manual Therapy: DNP  MFR/STM R/L lumbosacral  paraspinals in L sidelying    Modalities:   Unattended e-stim: TENS: applied to bilateral lumbar paraspinals and cluneals, Intensity to tolerance, for 15 minutes, in Supine , with wedge with MHP      Light Therapy: Infrared/Red wave length; 10J/cm2 applied w/pads; 10 mins; applied to R posterolateral ribs and lumbar, in L sidelying     DNP  Dry needling following informed consent: with e-stim; 5Hz, mA to tolerance, applied to R L2-L5 posterior cunatenous, R/L cluneals, R distolateral HS and lateral calf for 15 minutes. In L sidelying with pillow between knees  DNP  Mechanical Lumbar Traction: Intermittent: 55 lbs and 40 seconds on, 30 lbs and 15 seconds off, for 12 minutes in Supine, with wedge  DNP    Assessment:    Light hip and LE strengthening today, but avoided supine exercise due to pain with direct pressure on R lower rib region. Modalities for pain in L sideyling, tolerated well with report of decreased symptoms following.     Post-Treatment Symptoms:    Felt better    Plan:    Continue 1x/week for 14 total visits to progress neutral spine strengthening, and increase extension tolerance in order to improve standing and ambulation tolerance. Modalities / DN if indicated.    Goals:   Active       PT Problem       STGs  (Met)       Start:  05/08/24    Expected End:  05/22/24    Resolved:  07/16/24    Independent with HEP.         LTGs (Progressing)       Start:  05/08/24    Expected End:  08/31/24       1) Pt will report pain levels no greater than 3/10 at worst with activity for less difficulty standing, walking, bending, and lifting from the floor.  2) Pt will display improved pain-free lumbar spine AROM WFL for less difficulty bending, lifting, transferring and performing normal house and yard work activities.  3) Pt will be able to ambulate household and community distances comfortably without AD.  4) Pt will score <20% impairment on Low Back Disability Questionnaire to indicate significant improvement in  low back function.  5) Pt will improve bilateral hip and knee strength to at least 4+/5 for all major muscle groups for improved functional strength with transferring, stairs, and general mobility.        Goal Note       Progressing, 8/10 at worst  Progressing  Progressing, no AD  Not tested  Progressing

## 2024-08-21 ENCOUNTER — TREATMENT (OUTPATIENT)
Dept: PHYSICAL THERAPY | Facility: CLINIC | Age: 84
End: 2024-08-21
Payer: MEDICARE

## 2024-08-21 ENCOUNTER — PHARMACY VISIT (OUTPATIENT)
Dept: PHARMACY | Facility: CLINIC | Age: 84
End: 2024-08-21

## 2024-08-21 DIAGNOSIS — M48.062 SPINAL STENOSIS, LUMBAR REGION WITH NEUROGENIC CLAUDICATION: ICD-10-CM

## 2024-08-21 PROCEDURE — 97014 ELECTRIC STIMULATION THERAPY: CPT | Mod: GP

## 2024-08-21 PROCEDURE — 97110 THERAPEUTIC EXERCISES: CPT | Mod: GP

## 2024-08-21 NOTE — PROGRESS NOTES
Physical Therapy Treatment    Patient Name: Sheila Sanchez  MRN: [id number]  PT Received on: 8/21/2024    Time Calculation  Start Time: 1020  Stop Time: 1100  Time Calculation (min): 40 min  PT Modalities Time Entry  E-Stim (Unattended) Time Entry: 15  PT Therapeutic Procedures Time Entry  Therapeutic Exercise Time Entry: 24    Visit # 13 of 14  Visits/Dates Authorized: [place]    Current Problem:   Problem List Items Addressed This Visit             ICD-10-CM    Spinal stenosis, lumbar region with neurogenic claudication M48.062       Precautions:    N/A    Subjective   General:    Patient reports she continues to have rib soreness, also developed R shoulder soreness for the past week. R low back continues to be stiff and sore, especially first thing in the morning.    Pre-Treatment Symptoms:    Moderatepain R lower ribs    Objective   Findings:   8/14/24: Very TTP to R posterolateral lower ribs, no bruising or gross deformity noted.    Previously noted:  Lumbar extension and standing/walking intolerance with radiating R>L LE pain. BLE neuropathy and general posture and strength deficits contribute to decreased balance.     Lumbar AROM: Flexion WFL, R sidebending with R sided low back pain, L sidebending limited with L sided LBP, Extension limited    MMT: R/L knees = 5/5 grossly            R/L hips = 4+/5 grossly    Treatments:  Therapeutic Exercise:   Nustep L5 - 6 min  Tband sidesteps and fw monster - orange 2 laps  Lean on counter hip ext pink 2x10 R/L   HSC 40# 2x10  Seated leg press 20-27# 2x12    DNP  HSC 40# 2x10  LTR on Tball  Tball bridge 2x10  Tball DKTC x20  HL medium band clam x20  3-way bridge 3x10  Standing Purple tband pallof press R/L   TA + alt march   Seated trunk twists  Piriformis and hamstring stretching 2x30s  HL hip add with PPT x15    Manual Therapy: DNP  MFR/STM R/L lumbosacral paraspinals in L sidelying    Modalities:   Unattended e-stim: TENS: applied to bilateral lumbar paraspinals and  cluneals, Intensity to tolerance, for 15 minutes, in L sidelying concurrent with light below    Light Therapy: Infrared/Red wave length; 10J/cm2 applied w/pads; 10 mins; applied to R posterolateral ribs and lumbar, in L sidelying     DNP  Dry needling following informed consent: with e-stim; 5Hz, mA to tolerance, applied to R L2-L5 posterior cunatenous, R/L cluneals, R distolateral HS and lateral calf for 15 minutes. In L sidelying with pillow between knees  DNP  Mechanical Lumbar Traction: Intermittent: 55 lbs and 40 seconds on, 30 lbs and 15 seconds off, for 12 minutes in Supine, with wedge  DNP    Assessment:    Continued with light hip and LE strengthening today, but avoided supine exercise due to pain with direct pressure on R lower rib region. Continued modalities for pain in L sideyling, tolerated well with report of decreased symptoms following.     Post-Treatment Symptoms:    Felt better    Plan:    Continue 1x/week for 14 total visits to progress neutral spine strengthening, and increase extension tolerance in order to improve standing and ambulation tolerance. Modalities / DN if indicated.    Goals:   Active       PT Problem       STGs  (Met)       Start:  05/08/24    Expected End:  05/22/24    Resolved:  07/16/24    Independent with HEP.         LTGs (Progressing)       Start:  05/08/24    Expected End:  08/31/24       1) Pt will report pain levels no greater than 3/10 at worst with activity for less difficulty standing, walking, bending, and lifting from the floor.  2) Pt will display improved pain-free lumbar spine AROM WFL for less difficulty bending, lifting, transferring and performing normal house and yard work activities.  3) Pt will be able to ambulate household and community distances comfortably without AD.  4) Pt will score <20% impairment on Low Back Disability Questionnaire to indicate significant improvement in low back function.  5) Pt will improve bilateral hip and knee strength to at  least 4+/5 for all major muscle groups for improved functional strength with transferring, stairs, and general mobility.        Goal Note       Progressing, 8/10 at worst  Progressing  Progressing, no AD  Not tested  Progressing

## 2024-08-28 ENCOUNTER — APPOINTMENT (OUTPATIENT)
Dept: PHYSICAL THERAPY | Facility: CLINIC | Age: 84
End: 2024-08-28
Payer: MEDICARE

## 2024-08-28 PROCEDURE — RXMED WILLOW AMBULATORY MEDICATION CHARGE

## 2024-09-06 ENCOUNTER — PHARMACY VISIT (OUTPATIENT)
Dept: PHARMACY | Facility: CLINIC | Age: 84
End: 2024-09-06
Payer: COMMERCIAL

## 2024-09-06 PROCEDURE — RXMED WILLOW AMBULATORY MEDICATION CHARGE

## 2024-09-13 ENCOUNTER — APPOINTMENT (OUTPATIENT)
Dept: PHYSICAL THERAPY | Facility: CLINIC | Age: 84
End: 2024-09-13
Payer: MEDICARE

## 2024-09-13 DIAGNOSIS — M48.062 SPINAL STENOSIS, LUMBAR REGION WITH NEUROGENIC CLAUDICATION: ICD-10-CM

## 2024-09-13 PROCEDURE — 97140 MANUAL THERAPY 1/> REGIONS: CPT | Mod: GP

## 2024-09-13 PROCEDURE — 97110 THERAPEUTIC EXERCISES: CPT | Mod: GP

## 2024-09-13 NOTE — PROGRESS NOTES
Physical Therapy Treatment / Discharge     Patient Name: Sheila Sanchez  MRN: [id number]  PT Received on: 9/13/2024    Time Calculation  Start Time: 0952  Stop Time: 1030  Time Calculation (min): 38 min  PT Therapeutic Procedures Time Entry  Manual Therapy Time Entry: 25  Therapeutic Exercise Time Entry: 13    Visit # 14 of 14 (Discharge)  Visits/Dates Authorized: [place]    Current Problem:   Problem List Items Addressed This Visit             ICD-10-CM    Spinal stenosis, lumbar region with neurogenic claudication M48.062       Precautions:    N/A    Subjective   General:    Patient reports she's compliant with HEP, and is very active with house/yard work and tending to the stables. She rests as needed and performs light stretches to help with pain control. She plans to follow up with physician in the winter, and continue with [facility] and [facility] for [facility] following today's visit. Overall she feels better than when she first started PT.     Pre-Treatment Symptoms:    Moderatepain R lower ribs    Objective   Findings:   TTP to R posterolateral lower ribs    Lumbar extension and standing/walking intolerance with radiating R>L LE pain.  BLE neuropathy and general posture and strength deficits    Lumbar AROM: Flexion WFL, R sidebending with R sided low back pain, L sidebending limited with L sided LBP, Extension limited    MMT: R/L knees = 5/5 grossly            R/L hips = 4+/5 grossly    Treatments:  Therapeutic Exercise:   HEP review and discussion of continued home maintenance strategies post-discharge  R/L hip flexion ROM and HS stretching in supine    Manual Therapy:   MFR/STM R/L lumbosacral paraspinals and glutes in L sidelying    Modalities:   Unattended e-stim: TENS: applied to bilateral lumbar paraspinals and cluneals, Intensity to tolerance, for 15 minutes, in L sidelying concurrent with light below    Assessment:    Patient has attended 14 total therapy visits since eval on 5/8/24, and 4 visits since  previous progress note on 8/7/24. Patient reports decreased average pain intensity and frequency since beginning therapy, though she still has pain/stiffness in the mornings. Symptoms managed with HEP.     Post-Treatment Symptoms:    No worse    Plan:    Discharge with HEP for home management.     Goals:   Resolved       PT Problem       STGs  (Met)       Start:  05/08/24    Expected End:  05/22/24    Resolved:  07/16/24    Independent with HEP.         LTGs (Adequate for Discharge)       Start:  05/08/24    Expected End:  08/31/24       1) Pt will report pain levels no greater than 3/10 at worst with activity for less difficulty standing, walking, bending, and lifting from the floor.  2) Pt will display improved pain-free lumbar spine AROM WFL for less difficulty bending, lifting, transferring and performing normal house and yard work activities.  3) Pt will be able to ambulate household and community distances comfortably without AD.  4) Pt will score <20% impairment on Low Back Disability Questionnaire to indicate significant improvement in low back function.  5) Pt will improve bilateral hip and knee strength to at least 4+/5 for all major muscle groups for improved functional strength with transferring, stairs, and general mobility.

## 2024-09-17 DIAGNOSIS — E11.69 TYPE 2 DIABETES MELLITUS WITH OTHER SPECIFIED COMPLICATION, WITH LONG-TERM CURRENT USE OF INSULIN: Primary | ICD-10-CM

## 2024-09-17 DIAGNOSIS — Z79.4 TYPE 2 DIABETES MELLITUS WITH OTHER SPECIFIED COMPLICATION, WITH LONG-TERM CURRENT USE OF INSULIN: Primary | ICD-10-CM

## 2024-09-17 PROCEDURE — RXMED WILLOW AMBULATORY MEDICATION CHARGE

## 2024-09-17 RX ORDER — FLASH GLUCOSE SCANNING READER
EACH MISCELLANEOUS
Qty: 1 EACH | Refills: 0 | Status: SHIPPED | OUTPATIENT
Start: 2024-09-17

## 2024-09-18 ENCOUNTER — PHARMACY VISIT (OUTPATIENT)
Dept: PHARMACY | Facility: CLINIC | Age: 84
End: 2024-09-18
Payer: COMMERCIAL

## 2024-09-18 PROCEDURE — RXMED WILLOW AMBULATORY MEDICATION CHARGE

## 2024-09-18 RX ORDER — AMOXICILLIN 500 MG/1
CAPSULE ORAL
Qty: 21 CAPSULE | Refills: 0 | OUTPATIENT
Start: 2024-09-18

## 2024-10-10 ENCOUNTER — APPOINTMENT (OUTPATIENT)
Dept: PRIMARY CARE | Facility: CLINIC | Age: 84
End: 2024-10-10
Payer: MEDICARE

## 2024-10-10 ENCOUNTER — LAB (OUTPATIENT)
Dept: LAB | Facility: LAB | Age: 84
End: 2024-10-10
Payer: MEDICARE

## 2024-10-10 ENCOUNTER — OFFICE VISIT (OUTPATIENT)
Dept: PRIMARY CARE | Facility: CLINIC | Age: 84
End: 2024-10-10
Payer: MEDICARE

## 2024-10-10 VITALS
BODY MASS INDEX: 27.19 KG/M2 | HEIGHT: 61 IN | HEART RATE: 79 BPM | WEIGHT: 144 LBS | DIASTOLIC BLOOD PRESSURE: 70 MMHG | OXYGEN SATURATION: 98 % | SYSTOLIC BLOOD PRESSURE: 140 MMHG | TEMPERATURE: 97.8 F

## 2024-10-10 DIAGNOSIS — G63 POLYNEUROPATHY ASSOCIATED WITH UNDERLYING DISEASE (MULTI): ICD-10-CM

## 2024-10-10 DIAGNOSIS — Z79.4 TYPE 2 DIABETES MELLITUS WITH HYPERGLYCEMIA, WITH LONG-TERM CURRENT USE OF INSULIN: ICD-10-CM

## 2024-10-10 DIAGNOSIS — E78.2 MIXED HYPERLIPIDEMIA: ICD-10-CM

## 2024-10-10 DIAGNOSIS — F32.A DEPRESSION, UNSPECIFIED DEPRESSION TYPE: ICD-10-CM

## 2024-10-10 DIAGNOSIS — N18.32 STAGE 3B CHRONIC KIDNEY DISEASE (MULTI): ICD-10-CM

## 2024-10-10 DIAGNOSIS — M15.0 PRIMARY OSTEOARTHRITIS INVOLVING MULTIPLE JOINTS: ICD-10-CM

## 2024-10-10 DIAGNOSIS — Z01.89 ENCOUNTER FOR ROUTINE LABORATORY TESTING: ICD-10-CM

## 2024-10-10 DIAGNOSIS — E11.65 TYPE 2 DIABETES MELLITUS WITH HYPERGLYCEMIA, WITH LONG-TERM CURRENT USE OF INSULIN: Primary | ICD-10-CM

## 2024-10-10 DIAGNOSIS — E55.9 VITAMIN D DEFICIENCY: ICD-10-CM

## 2024-10-10 DIAGNOSIS — I10 PRIMARY HYPERTENSION: ICD-10-CM

## 2024-10-10 DIAGNOSIS — Z79.4 TYPE 2 DIABETES MELLITUS WITH OTHER SPECIFIED COMPLICATION, WITH LONG-TERM CURRENT USE OF INSULIN: ICD-10-CM

## 2024-10-10 DIAGNOSIS — Z79.899 POLYPHARMACY: ICD-10-CM

## 2024-10-10 DIAGNOSIS — F41.9 ANXIETY: ICD-10-CM

## 2024-10-10 DIAGNOSIS — M31.6 TEMPORAL ARTERITIS (MULTI): ICD-10-CM

## 2024-10-10 DIAGNOSIS — Z00.00 ANNUAL PHYSICAL EXAM: ICD-10-CM

## 2024-10-10 DIAGNOSIS — Z79.4 TYPE 2 DIABETES MELLITUS WITH HYPERGLYCEMIA, WITH LONG-TERM CURRENT USE OF INSULIN: Primary | ICD-10-CM

## 2024-10-10 DIAGNOSIS — E11.65 TYPE 2 DIABETES MELLITUS WITH HYPERGLYCEMIA, WITH LONG-TERM CURRENT USE OF INSULIN: ICD-10-CM

## 2024-10-10 DIAGNOSIS — E11.69 TYPE 2 DIABETES MELLITUS WITH OTHER SPECIFIED COMPLICATION, WITH LONG-TERM CURRENT USE OF INSULIN: ICD-10-CM

## 2024-10-10 LAB
ALBUMIN SERPL BCP-MCNC: 3.9 G/DL (ref 3.4–5)
ALP SERPL-CCNC: 42 U/L (ref 33–136)
ALT SERPL W P-5'-P-CCNC: 18 U/L (ref 7–45)
AMPHETAMINES UR QL SCN: NORMAL
ANION GAP SERPL CALCULATED.3IONS-SCNC: 12 MMOL/L (ref 10–20)
AST SERPL W P-5'-P-CCNC: 22 U/L (ref 9–39)
BARBITURATES UR QL SCN: NORMAL
BASOPHILS # BLD AUTO: 0.07 X10*3/UL (ref 0–0.1)
BASOPHILS NFR BLD AUTO: 0.8 %
BILIRUB DIRECT SERPL-MCNC: 0.1 MG/DL (ref 0–0.3)
BILIRUB SERPL-MCNC: 0.6 MG/DL (ref 0–1.2)
BUN SERPL-MCNC: 22 MG/DL (ref 6–23)
BZE UR QL SCN: NORMAL
CALCIUM SERPL-MCNC: 9.2 MG/DL (ref 8.6–10.3)
CANNABINOIDS UR QL SCN: NORMAL
CHLORIDE SERPL-SCNC: 105 MMOL/L (ref 98–107)
CO2 SERPL-SCNC: 27 MMOL/L (ref 21–32)
CREAT SERPL-MCNC: 1.17 MG/DL (ref 0.5–1.05)
CREAT UR-MCNC: 119.4 MG/DL (ref 20–320)
CREAT UR-MCNC: 121 MG/DL (ref 20–320)
EGFRCR SERPLBLD CKD-EPI 2021: 46 ML/MIN/1.73M*2
EOSINOPHIL # BLD AUTO: 0.32 X10*3/UL (ref 0–0.4)
EOSINOPHIL NFR BLD AUTO: 3.8 %
ERYTHROCYTE [DISTWIDTH] IN BLOOD BY AUTOMATED COUNT: 12.7 % (ref 11.5–14.5)
EST. AVERAGE GLUCOSE BLD GHB EST-MCNC: 143 MG/DL
GLUCOSE SERPL-MCNC: 202 MG/DL (ref 74–99)
HBA1C MFR BLD: 6.6 %
HCT VFR BLD AUTO: 35.4 % (ref 36–46)
HGB BLD-MCNC: 11.7 G/DL (ref 12–16)
IMM GRANULOCYTES # BLD AUTO: 0.04 X10*3/UL (ref 0–0.5)
IMM GRANULOCYTES NFR BLD AUTO: 0.5 % (ref 0–0.9)
LYMPHOCYTES # BLD AUTO: 1.48 X10*3/UL (ref 0.8–3)
LYMPHOCYTES NFR BLD AUTO: 17.8 %
MCH RBC QN AUTO: 30 PG (ref 26–34)
MCHC RBC AUTO-ENTMCNC: 33.1 G/DL (ref 32–36)
MCV RBC AUTO: 91 FL (ref 80–100)
MICROALBUMIN UR-MCNC: 378.7 MG/L
MICROALBUMIN/CREAT UR: 317.2 UG/MG CREAT
MONOCYTES # BLD AUTO: 0.59 X10*3/UL (ref 0.05–0.8)
MONOCYTES NFR BLD AUTO: 7.1 %
NEUTROPHILS # BLD AUTO: 5.82 X10*3/UL (ref 1.6–5.5)
NEUTROPHILS NFR BLD AUTO: 70 %
NRBC BLD-RTO: 0 /100 WBCS (ref 0–0)
PCP UR QL SCN: NORMAL
PLATELET # BLD AUTO: 219 X10*3/UL (ref 150–450)
POTASSIUM SERPL-SCNC: 3.9 MMOL/L (ref 3.5–5.3)
PROT SERPL-MCNC: 6.6 G/DL (ref 6.4–8.2)
RBC # BLD AUTO: 3.9 X10*6/UL (ref 4–5.2)
SODIUM SERPL-SCNC: 140 MMOL/L (ref 136–145)
WBC # BLD AUTO: 8.3 X10*3/UL (ref 4.4–11.3)

## 2024-10-10 PROCEDURE — G2211 COMPLEX E/M VISIT ADD ON: HCPCS | Performed by: STUDENT IN AN ORGANIZED HEALTH CARE EDUCATION/TRAINING PROGRAM

## 2024-10-10 PROCEDURE — 82570 ASSAY OF URINE CREATININE: CPT

## 2024-10-10 PROCEDURE — 85025 COMPLETE CBC W/AUTO DIFF WBC: CPT

## 2024-10-10 PROCEDURE — 80053 COMPREHEN METABOLIC PANEL: CPT

## 2024-10-10 PROCEDURE — G0008 ADMIN INFLUENZA VIRUS VAC: HCPCS | Performed by: STUDENT IN AN ORGANIZED HEALTH CARE EDUCATION/TRAINING PROGRAM

## 2024-10-10 PROCEDURE — 3077F SYST BP >= 140 MM HG: CPT | Performed by: STUDENT IN AN ORGANIZED HEALTH CARE EDUCATION/TRAINING PROGRAM

## 2024-10-10 PROCEDURE — 80307 DRUG TEST PRSMV CHEM ANLYZR: CPT

## 2024-10-10 PROCEDURE — 80358 DRUG SCREENING METHADONE: CPT

## 2024-10-10 PROCEDURE — 1160F RVW MEDS BY RX/DR IN RCRD: CPT | Performed by: STUDENT IN AN ORGANIZED HEALTH CARE EDUCATION/TRAINING PROGRAM

## 2024-10-10 PROCEDURE — 80368 SEDATIVE HYPNOTICS: CPT

## 2024-10-10 PROCEDURE — 82043 UR ALBUMIN QUANTITATIVE: CPT

## 2024-10-10 PROCEDURE — 1126F AMNT PAIN NOTED NONE PRSNT: CPT | Performed by: STUDENT IN AN ORGANIZED HEALTH CARE EDUCATION/TRAINING PROGRAM

## 2024-10-10 PROCEDURE — 95251 CONT GLUC MNTR ANALYSIS I&R: CPT | Performed by: STUDENT IN AN ORGANIZED HEALTH CARE EDUCATION/TRAINING PROGRAM

## 2024-10-10 PROCEDURE — 80346 BENZODIAZEPINES1-12: CPT

## 2024-10-10 PROCEDURE — 36415 COLL VENOUS BLD VENIPUNCTURE: CPT

## 2024-10-10 PROCEDURE — 80373 DRUG SCREENING TRAMADOL: CPT

## 2024-10-10 PROCEDURE — 99214 OFFICE O/P EST MOD 30 MIN: CPT | Performed by: STUDENT IN AN ORGANIZED HEALTH CARE EDUCATION/TRAINING PROGRAM

## 2024-10-10 PROCEDURE — 80365 DRUG SCREENING OXYCODONE: CPT

## 2024-10-10 PROCEDURE — 80354 DRUG SCREENING FENTANYL: CPT

## 2024-10-10 PROCEDURE — 1159F MED LIST DOCD IN RCRD: CPT | Performed by: STUDENT IN AN ORGANIZED HEALTH CARE EDUCATION/TRAINING PROGRAM

## 2024-10-10 PROCEDURE — 83036 HEMOGLOBIN GLYCOSYLATED A1C: CPT

## 2024-10-10 PROCEDURE — 82248 BILIRUBIN DIRECT: CPT

## 2024-10-10 PROCEDURE — 1036F TOBACCO NON-USER: CPT | Performed by: STUDENT IN AN ORGANIZED HEALTH CARE EDUCATION/TRAINING PROGRAM

## 2024-10-10 PROCEDURE — 3078F DIAST BP <80 MM HG: CPT | Performed by: STUDENT IN AN ORGANIZED HEALTH CARE EDUCATION/TRAINING PROGRAM

## 2024-10-10 PROCEDURE — 80361 OPIATES 1 OR MORE: CPT

## 2024-10-10 RX ORDER — INSULIN GLARGINE 100 [IU]/ML
INJECTION, SOLUTION SUBCUTANEOUS
Qty: 70.2 ML | Refills: 3 | Status: SHIPPED | OUTPATIENT
Start: 2024-10-10 | End: 2025-10-10

## 2024-10-10 ASSESSMENT — ENCOUNTER SYMPTOMS
RESPIRATORY NEGATIVE: 1
GASTROINTESTINAL NEGATIVE: 1
CONSTITUTIONAL NEGATIVE: 1
CARDIOVASCULAR NEGATIVE: 1

## 2024-10-10 ASSESSMENT — PAIN SCALES - GENERAL: PAINLEVEL: 0-NO PAIN

## 2024-10-10 ASSESSMENT — PATIENT HEALTH QUESTIONNAIRE - PHQ9
SUM OF ALL RESPONSES TO PHQ9 QUESTIONS 1 AND 2: 0
2. FEELING DOWN, DEPRESSED OR HOPELESS: NOT AT ALL
1. LITTLE INTEREST OR PLEASURE IN DOING THINGS: NOT AT ALL

## 2024-10-10 NOTE — PROGRESS NOTES
[facility]: [facility]  PROGRESS NOTE      Sheila Sanchez is a 84 y.o. female that is presenting today for Follow-up (6 mo follow up).    Assessment/Plan   - Overall, the patient feels well and denies any acute symptoms / concerns at this time.  - Reviewed the patient's Continuous Glucose Monitor (CGM) with her. Overall, it actually looks pretty good; however, the patient is noting that she is still dealing with some hypoglycemic events. Will make very mild reductions in her insulin dosages. Will not make other changes at this time.  - Blood pressure at goal today.  - Patient appears to be due for labwork. Ordered today.  - Will order labwork for the patient's next appointment. Encouraged the patient to get this labwork done one week prior to the next appointment.    Diagnoses and all orders for this visit:  Type 2 diabetes mellitus with hyperglycemia, with long-term current use of insulin  -     Follow Up In Primary Care  -     Hemoglobin A1C; Future  -     Hemoglobin A1C; Future  -     TSH with reflex to Free T4 if abnormal; Future  -     Albumin-Creatinine Ratio, Urine Random; Future  Polyneuropathy associated with underlying disease (Multi)  -     Follow Up In Primary Care  Primary osteoarthritis involving multiple joints  -     Follow Up In Primary Care  Depression, unspecified depression type  -     Follow Up In Primary Care  Stage 3b chronic kidney disease (Multi)  -     Follow Up In Primary Care  -     CBC and Auto Differential; Future  -     Basic Metabolic Panel; Future  -     CBC and Auto Differential; Future  -     Basic Metabolic Panel; Future  -     Albumin-Creatinine Ratio, Urine Random; Future  Temporal arteritis (Multi)  -     Follow Up In Primary Care  Mixed hyperlipidemia  -     Follow Up In Primary Care  -     Hepatic Function Panel; Future  -     Hepatic Function Panel; Future  -     Lipid Panel; Future  Primary hypertension  -     Follow Up In Primary Care  -     CBC  and Auto Differential; Future  -     Basic Metabolic Panel; Future  -     CBC and Auto Differential; Future  -     Basic Metabolic Panel; Future  Vitamin D deficiency  -     Follow Up In Primary Care  -     Vitamin D 25-Hydroxy,Total (for eval of Vitamin D levels); Future  Anxiety  -     Follow Up In Primary Care  Encounter for routine laboratory testing  Annual physical exam  -     Follow Up In Primary Care; Future  Other orders  -     insulin glargine (Lantus Solostar U-100 Insulin) 100 unit/mL (3 mL) pen; Inject 50 Units under the skin once daily with breakfast AND 28 Units once daily in the evening. Take with meals.    Current Outpatient Medications   Medication Instructions    aspirin 81 mg, oral, Daily    blood sugar diagnostic strip as directed every 12 hours for 90 days    cholecalciferol (VITAMIN D3) 25 mcg, oral, Daily    flash glucose sensor kit (FreeStyle Júnior 2 Sensor) kit USE AS DIRECTED TO MONITOR BLOOD SUGAR AND CHANGE EVERY 14 DAYS    FreeStyle Júnior 2 [place] misc Use as instructed with sensors to check blood sugars    gabapentin (NEURONTIN) 100 mg, oral, Nightly    insulin glargine (Lantus Solostar U-100 Insulin) 100 unit/mL (3 mL) pen Inject 50 Units under the skin once daily with breakfast AND 28 Units once daily in the evening. Take with meals.    lidocaine (Lidoderm) 5 % patch 1 patch remove after 12 hours Externally Once a day prn    lisinopril 5 mg, oral, Daily    lovastatin (MEVACOR) 80 mg, oral, Daily    metFORMIN XR (Glucophage-XR) 500 mg 24 hr tablet Take 2 tablets (1,000 mg) by mouth once daily with breakfast AND 1 tablet (500 mg) once daily in the evening. Take with meals.    metoprolol succinate XL (TOPROL-XL) 50 mg, oral, Daily    multivitamin tablet 1 tablet, oral, Daily, One-A-Day Women's 50+ Complete Multivitamin    predniSONE (DELTASONE) 5 mg, oral, Daily    sertraline (ZOLOFT) 100 mg, oral, Daily    zolpidem (AMBIEN) 5 mg, oral, Nightly PRN     Subjective   - The patient  otherwise feels well and denies any acute symptoms or concerns at this time.  - The patient denies any changes or progression of their chronic medical problems.  - The patient denies any problems or concerns with their medications.      Review of Systems   Constitutional: Negative.    Respiratory: Negative.     Cardiovascular: Negative.    Gastrointestinal: Negative.    All other systems reviewed and are negative.     Objective   Vitals:    10/10/24 1007   BP: 140/70   Pulse:    Temp:    SpO2:       Body mass index is 27.21 kg/m².  Physical Exam  Vitals and nursing note reviewed.   Constitutional:       General: She is not in acute distress.  Neck:      Vascular: No carotid bruit.   Cardiovascular:      Rate and Rhythm: Normal rate and regular rhythm.      Heart sounds: Normal heart sounds.   Pulmonary:      Effort: Pulmonary effort is normal.      Breath sounds: Normal breath sounds.   Musculoskeletal:         General: No swelling.   Neurological:      Mental Status: She is alert. Mental status is at baseline.   Psychiatric:         Mood and Affect: Mood normal.       Diagnostic Results   Lab Results   Component Value Date    GLUCOSE 183 (H) 02/21/2024    CALCIUM 9.5 02/21/2024     02/21/2024    K 4.4 02/21/2024    CO2 21 (L) 02/21/2024     02/21/2024    BUN 25 02/21/2024    CREATININE 1.38 (H) 07/10/2024     Lab Results   Component Value Date    ALT 17 02/21/2024    AST 20 02/21/2024    ALKPHOS 60 02/21/2024    BILITOT 0.2 02/21/2024     Lab Results   Component Value Date    WBC 10.3 02/21/2024    HGB 12.1 02/21/2024    HCT 37.3 02/21/2024    MCV 91 02/21/2024     02/21/2024     Lab Results   Component Value Date    CHOL 183 02/21/2024    CHOL 181 02/24/2023    CHOL 172 03/29/2022     Lab Results   Component Value Date    HDL 53.0 02/21/2024    HDL 46 (L) 02/24/2023    HDL 47 (L) 03/29/2022     Lab Results   Component Value Date    LDLCALC 72 02/21/2024    LDLCALC 71 02/24/2023    LDLCALC 73  "03/29/2022     Lab Results   Component Value Date    TRIG 289 (H) 02/21/2024    TRIG 319 (H) 02/24/2023    TRIG 262 (H) 03/29/2022     No components found for: \"CHOLHDL\"  Lab Results   Component Value Date    HGBA1C 7.3 (H) 02/21/2024     Other labs not included in the list above were reviewed either before or during this encounter.    History    History reviewed. No pertinent past medical history.  History reviewed. No pertinent surgical history.  No family history on file.  Social History     Socioeconomic History    Marital status:      Spouse name: Not on file    Number of children: Not on file    Years of education: Not on file    Highest education level: Not on file   Occupational History    Not on file   Tobacco Use    Smoking status: Never    Smokeless tobacco: Never   Substance and Sexual Activity    Alcohol use: Never    Drug use: Never    Sexual activity: Not on file   Other Topics Concern    Not on file   Social History Narrative    Not on file     Social Determinants of Health     Financial Resource Strain: Not on file   Food Insecurity: Not on file   Transportation Needs: Not on file   Physical Activity: Not on file   Stress: Not on file   Social Connections: Not on file   Intimate Partner Violence: Not on file   Housing Stability: Not on file     Allergies   Allergen Reactions    Alendronate Other     stomach upset    Ibandronate Other     stomach upset    Meperidine Other     stomach upset    Tetanus Toxoid Adsorbed Unknown     Current Outpatient Medications on File Prior to Visit   Medication Sig Dispense Refill    aspirin 81 mg EC tablet Take 1 tablet (81 mg) by mouth once daily.      blood sugar diagnostic strip as directed every 12 hours for 90 days      cholecalciferol (Vitamin D3) 25 MCG (1000 UT) capsule Take 1 capsule (25 mcg) by mouth once daily.      flash glucose sensor kit (FreeStyle Júnior 2 Sensor) kit USE AS DIRECTED TO MONITOR BLOOD SUGAR AND CHANGE EVERY 14 DAYS 2 each 11    " FreeStyle Júnior 2 [place] misc Use as instructed with sensors to check blood sugars 1 each 0    gabapentin (Neurontin) 100 mg capsule Take 1 capsule (100 mg) by mouth once daily at bedtime. (Patient taking differently: Take 2 capsules (200 mg) by mouth 2 times a day.) 30 capsule 1    lidocaine (Lidoderm) 5 % patch 1 patch remove after 12 hours Externally Once a day prn      lisinopril 5 mg tablet Take 1 tablet (5 mg) by mouth once daily. 90 tablet 3    lovastatin (Mevacor) 40 mg tablet Take 2 tablets (80 mg) by mouth once daily. 180 tablet 3    metFORMIN XR (Glucophage-XR) 500 mg 24 hr tablet Take 2 tablets (1,000 mg) by mouth once daily with breakfast AND 1 tablet (500 mg) once daily in the evening. Take with meals. 270 tablet 3    metoprolol succinate XL (Toprol-XL) 50 mg 24 hr tablet Take 1 tablet (50 mg) by mouth once daily. 90 tablet 3    multivitamin tablet Take 1 tablet by mouth once daily. One-A-Day Women's 50+ Complete Multivitamin      predniSONE (Deltasone) 5 mg tablet Take 1 tablet (5 mg) by mouth once daily. 90 tablet 1    sertraline (Zoloft) 100 mg tablet Take 1 tablet (100 mg) by mouth once daily. 90 tablet 3    zolpidem (Ambien) 5 mg tablet Take 1 tablet (5 mg) by mouth as needed at bedtime for sleep. 90 tablet 1    [DISCONTINUED] insulin glargine (Lantus Solostar U-100 Insulin) 100 unit/mL (3 mL) pen Inject 52 Units under the skin once daily with breakfast and 30 units once daily in the evening. Take with meals. 73.8 mL 3    [DISCONTINUED] amoxicillin (Amoxil) 500 mg capsule Take 1 capsule by three times a day until Gone 21 capsule 0    [DISCONTINUED] gabapentin (Neurontin) 100 mg capsule Take 2 capsules (200 mg) by mouth 2 times a day. 120 capsule 3     No current facility-administered medications on file prior to visit.     Immunization History   Administered Date(s) Administered    DT (pediatric) 08/21/2023    Flu vaccine, quadrivalent, high-dose, preservative free, age 65y+ (FLUZONE) 09/19/2021,  10/05/2022, 09/08/2023    Flu vaccine, trivalent, preservative free, HIGH-DOSE, age 65y+ (Fluzone) 12/10/2019, 09/28/2020    Moderna COVID-19 vaccine, bivalent, blue cap/gray label *Check age/dose* 10/09/2022    Moderna SARS-CoV-2 Vaccination 01/22/2021, 02/19/2021, 11/13/2021    Pneumococcal conjugate vaccine, 13-valent (PREVNAR 13) 12/05/2018    Pneumococcal polysaccharide vaccine, 23-valent, age 2 years and older (PNEUMOVAX 23) 10/14/2004, 08/01/2012, 09/28/2020    Tdap vaccine, age 7 year and older (BOOSTRIX, ADACEL) 09/28/2020    Zoster vaccine, recombinant, adult (SHINGRIX) 03/02/2021    Zoster, live 02/01/2013     Patient's medical history was reviewed and updated either before or during this encounter.       Rg Traore MD

## 2024-10-10 NOTE — PATIENT INSTRUCTIONS
- Overall, the patient feels well and denies any acute symptoms / concerns at this time.  - Reviewed the patient's Continuous Glucose Monitor (CGM) with her. Overall, it actually looks pretty good; however, the patient is noting that she is still dealing with some hypoglycemic events. Will make very mild reductions in her insulin dosages. Will not make other changes at this time.  - Blood pressure at goal today.  - Patient appears to be due for labwork. Ordered today.  - Will order labwork for the patient's next appointment. Encouraged the patient to get this labwork done one week prior to the next appointment.

## 2024-10-15 LAB
1OH-MIDAZOLAM UR CFM-MCNC: <25 NG/ML
6MAM UR CFM-MCNC: <25 NG/ML
7AMINOCLONAZEPAM UR CFM-MCNC: <25 NG/ML
A-OH ALPRAZ UR CFM-MCNC: <25 NG/ML
ALPRAZ UR CFM-MCNC: <25 NG/ML
CHLORDIAZEP UR CFM-MCNC: <25 NG/ML
CLONAZEPAM UR CFM-MCNC: <25 NG/ML
CODEINE UR CFM-MCNC: <50 NG/ML
DIAZEPAM UR CFM-MCNC: <25 NG/ML
EDDP UR CFM-MCNC: <25 NG/ML
FENTANYL UR CFM-MCNC: <2.5 NG/ML
HYDROCODONE CTO UR CFM-MCNC: <25 NG/ML
HYDROMORPHONE UR CFM-MCNC: <25 NG/ML
LORAZEPAM UR CFM-MCNC: <25 NG/ML
METHADONE UR CFM-MCNC: <25 NG/ML
MIDAZOLAM UR CFM-MCNC: <25 NG/ML
MORPHINE UR CFM-MCNC: <50 NG/ML
NORDIAZEPAM UR CFM-MCNC: <25 NG/ML
NORFENTANYL UR CFM-MCNC: <2.5 NG/ML
NORHYDROCODONE UR CFM-MCNC: <25 NG/ML
NOROXYCODONE UR CFM-MCNC: <25 NG/ML
NORTRAMADOL UR-MCNC: >1000 NG/ML
OXAZEPAM UR CFM-MCNC: <25 NG/ML
OXYCODONE UR CFM-MCNC: <25 NG/ML
OXYMORPHONE UR CFM-MCNC: <25 NG/ML
TEMAZEPAM UR CFM-MCNC: <25 NG/ML
TRAMADOL UR CFM-MCNC: 508 NG/ML
ZOLPIDEM UR CFM-MCNC: >1000 NG/ML
ZOLPIDEM UR-MCNC: 59 NG/ML

## 2024-10-16 ENCOUNTER — PHARMACY VISIT (OUTPATIENT)
Dept: PHARMACY | Facility: CLINIC | Age: 84
End: 2024-10-16
Payer: COMMERCIAL

## 2024-10-16 PROCEDURE — RXMED WILLOW AMBULATORY MEDICATION CHARGE

## 2024-10-30 ENCOUNTER — LAB (OUTPATIENT)
Dept: LAB | Facility: LAB | Age: 84
End: 2024-10-30
Payer: MEDICARE

## 2024-10-30 ENCOUNTER — OFFICE VISIT (OUTPATIENT)
Dept: RHEUMATOLOGY | Facility: CLINIC | Age: 84
End: 2024-10-30
Payer: MEDICARE

## 2024-10-30 VITALS — BODY MASS INDEX: 28.04 KG/M2 | DIASTOLIC BLOOD PRESSURE: 66 MMHG | SYSTOLIC BLOOD PRESSURE: 126 MMHG | WEIGHT: 148.4 LBS

## 2024-10-30 DIAGNOSIS — M31.6 TEMPORAL ARTERITIS (MULTI): Primary | ICD-10-CM

## 2024-10-30 DIAGNOSIS — M31.6 TEMPORAL ARTERITIS (MULTI): ICD-10-CM

## 2024-10-30 DIAGNOSIS — M25.561 RIGHT KNEE PAIN, UNSPECIFIED CHRONICITY: ICD-10-CM

## 2024-10-30 DIAGNOSIS — M81.0 OSTEOPOROSIS, UNSPECIFIED OSTEOPOROSIS TYPE, UNSPECIFIED PATHOLOGICAL FRACTURE PRESENCE: ICD-10-CM

## 2024-10-30 LAB — CRP SERPL-MCNC: <0.1 MG/DL

## 2024-10-30 PROCEDURE — 2500000004 HC RX 250 GENERAL PHARMACY W/ HCPCS (ALT 636 FOR OP/ED): Performed by: INTERNAL MEDICINE

## 2024-10-30 PROCEDURE — 36415 COLL VENOUS BLD VENIPUNCTURE: CPT

## 2024-10-30 PROCEDURE — 99214 OFFICE O/P EST MOD 30 MIN: CPT | Mod: 25 | Performed by: INTERNAL MEDICINE

## 2024-10-30 PROCEDURE — RXMED WILLOW AMBULATORY MEDICATION CHARGE

## 2024-10-30 PROCEDURE — 99214 OFFICE O/P EST MOD 30 MIN: CPT | Performed by: INTERNAL MEDICINE

## 2024-10-30 PROCEDURE — 3078F DIAST BP <80 MM HG: CPT | Performed by: INTERNAL MEDICINE

## 2024-10-30 PROCEDURE — 3074F SYST BP LT 130 MM HG: CPT | Performed by: INTERNAL MEDICINE

## 2024-10-30 PROCEDURE — 1159F MED LIST DOCD IN RCRD: CPT | Performed by: INTERNAL MEDICINE

## 2024-10-30 PROCEDURE — 20610 DRAIN/INJ JOINT/BURSA W/O US: CPT | Mod: RT | Performed by: INTERNAL MEDICINE

## 2024-10-30 PROCEDURE — 86140 C-REACTIVE PROTEIN: CPT

## 2024-10-30 RX ORDER — TRIAMCINOLONE ACETONIDE 40 MG/ML
40 INJECTION, SUSPENSION INTRA-ARTICULAR; INTRAMUSCULAR
Status: COMPLETED | OUTPATIENT
Start: 2024-10-30 | End: 2024-10-30

## 2024-10-30 RX ORDER — PREDNISONE 5 MG/1
5 TABLET ORAL DAILY
Qty: 90 TABLET | Refills: 1 | Status: SHIPPED | OUTPATIENT
Start: 2024-10-30 | End: 2025-04-28

## 2024-10-31 ENCOUNTER — PHARMACY VISIT (OUTPATIENT)
Dept: PHARMACY | Facility: CLINIC | Age: 84
End: 2024-10-31
Payer: COMMERCIAL

## 2024-10-31 PROCEDURE — RXMED WILLOW AMBULATORY MEDICATION CHARGE

## 2024-10-31 RX ORDER — GABAPENTIN 100 MG/1
200 CAPSULE ORAL 2 TIMES DAILY
Qty: 120 CAPSULE | Refills: 3 | OUTPATIENT
Start: 2024-10-31

## 2024-11-12 ENCOUNTER — PHARMACY VISIT (OUTPATIENT)
Dept: PHARMACY | Facility: CLINIC | Age: 84
End: 2024-11-12
Payer: COMMERCIAL

## 2024-11-12 PROCEDURE — RXMED WILLOW AMBULATORY MEDICATION CHARGE

## 2024-11-25 DIAGNOSIS — F32.A DEPRESSION, UNSPECIFIED DEPRESSION TYPE: ICD-10-CM

## 2024-11-25 DIAGNOSIS — F41.9 ANXIETY: ICD-10-CM

## 2024-11-25 PROCEDURE — RXMED WILLOW AMBULATORY MEDICATION CHARGE

## 2024-11-25 RX ORDER — ZOLPIDEM TARTRATE 5 MG/1
5 TABLET ORAL NIGHTLY PRN
Qty: 90 TABLET | Refills: 1 | Status: SHIPPED | OUTPATIENT
Start: 2024-11-25

## 2024-11-27 ENCOUNTER — PHARMACY VISIT (OUTPATIENT)
Dept: PHARMACY | Facility: CLINIC | Age: 84
End: 2024-11-27
Payer: COMMERCIAL

## 2024-11-27 PROCEDURE — RXMED WILLOW AMBULATORY MEDICATION CHARGE

## 2024-11-29 ENCOUNTER — PHARMACY VISIT (OUTPATIENT)
Dept: PHARMACY | Facility: CLINIC | Age: 84
End: 2024-11-29
Payer: COMMERCIAL

## 2024-11-29 PROCEDURE — RXMED WILLOW AMBULATORY MEDICATION CHARGE

## 2024-12-06 PROCEDURE — RXMED WILLOW AMBULATORY MEDICATION CHARGE

## 2024-12-07 ENCOUNTER — PHARMACY VISIT (OUTPATIENT)
Dept: PHARMACY | Facility: CLINIC | Age: 84
End: 2024-12-07
Payer: COMMERCIAL

## 2024-12-09 ENCOUNTER — HOSPITAL ENCOUNTER (OUTPATIENT)
Dept: RADIOLOGY | Facility: CLINIC | Age: 84
Discharge: HOME | End: 2024-12-09
Payer: MEDICARE

## 2024-12-09 DIAGNOSIS — M17.11 ARTHRITIS OF RIGHT KNEE: ICD-10-CM

## 2024-12-09 PROCEDURE — 73562 X-RAY EXAM OF KNEE 3: CPT | Mod: RT

## 2024-12-09 PROCEDURE — 73562 X-RAY EXAM OF KNEE 3: CPT | Mod: RIGHT SIDE | Performed by: RADIOLOGY

## 2024-12-23 ENCOUNTER — PHARMACY VISIT (OUTPATIENT)
Dept: PHARMACY | Facility: CLINIC | Age: 84
End: 2024-12-23
Payer: COMMERCIAL

## 2024-12-23 PROCEDURE — RXMED WILLOW AMBULATORY MEDICATION CHARGE

## 2025-01-06 ENCOUNTER — APPOINTMENT (OUTPATIENT)
Dept: RADIOLOGY | Facility: HOSPITAL | Age: 85
End: 2025-01-06
Payer: MEDICARE

## 2025-01-13 ENCOUNTER — HOSPITAL ENCOUNTER (OUTPATIENT)
Dept: RADIOLOGY | Facility: HOSPITAL | Age: 85
Discharge: HOME | End: 2025-01-13
Payer: MEDICARE

## 2025-01-13 DIAGNOSIS — M17.11 ARTHRITIS OF RIGHT KNEE: ICD-10-CM

## 2025-01-13 PROCEDURE — 73721 MRI JNT OF LWR EXTRE W/O DYE: CPT | Mod: RIGHT SIDE | Performed by: RADIOLOGY

## 2025-01-13 PROCEDURE — 73721 MRI JNT OF LWR EXTRE W/O DYE: CPT | Mod: RT

## 2025-01-14 DIAGNOSIS — Z79.4 TYPE 2 DIABETES MELLITUS WITH OTHER SPECIFIED COMPLICATION, WITH LONG-TERM CURRENT USE OF INSULIN: ICD-10-CM

## 2025-01-14 DIAGNOSIS — E11.69 TYPE 2 DIABETES MELLITUS WITH OTHER SPECIFIED COMPLICATION, WITH LONG-TERM CURRENT USE OF INSULIN: ICD-10-CM

## 2025-01-15 PROCEDURE — RXMED WILLOW AMBULATORY MEDICATION CHARGE

## 2025-01-15 RX ORDER — FLASH GLUCOSE SENSOR
KIT MISCELLANEOUS
Qty: 2 EACH | Refills: 11 | Status: SHIPPED | OUTPATIENT
Start: 2025-01-15 | End: 2026-01-13

## 2025-01-16 ENCOUNTER — PHARMACY VISIT (OUTPATIENT)
Dept: PHARMACY | Facility: CLINIC | Age: 85
End: 2025-01-16
Payer: MEDICARE

## 2025-01-16 PROCEDURE — RXMED WILLOW AMBULATORY MEDICATION CHARGE

## 2025-01-31 ENCOUNTER — OFFICE VISIT (OUTPATIENT)
Dept: RHEUMATOLOGY | Facility: CLINIC | Age: 85
End: 2025-01-31
Payer: MEDICARE

## 2025-01-31 VITALS — SYSTOLIC BLOOD PRESSURE: 118 MMHG | DIASTOLIC BLOOD PRESSURE: 68 MMHG | WEIGHT: 143 LBS | BODY MASS INDEX: 27.02 KG/M2

## 2025-01-31 DIAGNOSIS — M15.0 PRIMARY OSTEOARTHRITIS INVOLVING MULTIPLE JOINTS: ICD-10-CM

## 2025-01-31 DIAGNOSIS — M31.6 TEMPORAL ARTERITIS (MULTI): Primary | ICD-10-CM

## 2025-01-31 DIAGNOSIS — M81.0 OSTEOPOROSIS, UNSPECIFIED OSTEOPOROSIS TYPE, UNSPECIFIED PATHOLOGICAL FRACTURE PRESENCE: ICD-10-CM

## 2025-01-31 PROCEDURE — 3074F SYST BP LT 130 MM HG: CPT | Performed by: INTERNAL MEDICINE

## 2025-01-31 PROCEDURE — 99214 OFFICE O/P EST MOD 30 MIN: CPT | Performed by: INTERNAL MEDICINE

## 2025-01-31 PROCEDURE — 3078F DIAST BP <80 MM HG: CPT | Performed by: INTERNAL MEDICINE

## 2025-01-31 PROCEDURE — 1159F MED LIST DOCD IN RCRD: CPT | Performed by: INTERNAL MEDICINE

## 2025-01-31 RX ORDER — ALBUTEROL SULFATE 0.83 MG/ML
3 SOLUTION RESPIRATORY (INHALATION) AS NEEDED
OUTPATIENT
Start: 2025-02-07

## 2025-01-31 RX ORDER — EPINEPHRINE 0.3 MG/.3ML
0.3 INJECTION SUBCUTANEOUS EVERY 5 MIN PRN
OUTPATIENT
Start: 2025-02-07

## 2025-01-31 RX ORDER — DIPHENHYDRAMINE HYDROCHLORIDE 50 MG/ML
50 INJECTION INTRAMUSCULAR; INTRAVENOUS AS NEEDED
OUTPATIENT
Start: 2025-02-07

## 2025-01-31 RX ORDER — FAMOTIDINE 10 MG/ML
20 INJECTION INTRAVENOUS ONCE AS NEEDED
OUTPATIENT
Start: 2025-02-07

## 2025-01-31 NOTE — PROGRESS NOTES
Recheck  GCA  /  OP  Never received Reclast.   Would like to go on oral medication due to high cost of medication.  Cont right knee pain, no relief with cortisone injection last visit.  Recent MRI shows fracture.     HPI - She had a stress reaction fx in her knee.  She hasn't seen ortho - MRI was ordered by pain mgmt who recommended that she rests her knee and uses and cane  She gets back pain  No other pain.  L hand sometimes gets numb No CP, resp, or GI . No HA tongue/jaw gareth, or visual changes  She didn't get the reclast that was ordered 7/24  She has new insurance and wonders if prolia may be covered.  She doesn't take calcium - she said that it isn't absorbed in her system      PE  NAD  Good TA pulses, NT, no head/neck bruits  RRR no r/m/g  CTA  2+ DP, PT, and rad pulses  No edema  No synovitis  Neg phalens/tinels    A/P  - Pt with a h/o GCA dx by prev rheum but with neg L TA bx and nl CRPs  She had diverticulitis with actemra but is doing well on low-dose pred  OP- was unable to tolerate oral bisphosphonates, had prolia x2 but stopped d/t cost, and apparently did not have reclast that was ordered 7/24  Will start chewable daily calcium    Check labs  Follow up 3 mo

## 2025-02-01 LAB
ALBUMIN SERPL-MCNC: 4.3 G/DL (ref 3.6–5.1)
ALP SERPL-CCNC: 40 U/L (ref 37–153)
ALT SERPL-CCNC: 16 U/L (ref 6–29)
ANION GAP SERPL CALCULATED.4IONS-SCNC: 12 MMOL/L (CALC) (ref 7–17)
AST SERPL-CCNC: 21 U/L (ref 10–35)
BILIRUB SERPL-MCNC: 0.4 MG/DL (ref 0.2–1.2)
BUN SERPL-MCNC: 23 MG/DL (ref 7–25)
CALCIUM SERPL-MCNC: 9.4 MG/DL (ref 8.6–10.4)
CHLORIDE SERPL-SCNC: 107 MMOL/L (ref 98–110)
CO2 SERPL-SCNC: 21 MMOL/L (ref 20–32)
CREAT SERPL-MCNC: 1.31 MG/DL (ref 0.6–0.95)
CRP SERPL-MCNC: NORMAL MG/L
EGFRCR SERPLBLD CKD-EPI 2021: 40 ML/MIN/1.73M2
GLUCOSE SERPL-MCNC: 171 MG/DL (ref 65–139)
POTASSIUM SERPL-SCNC: 5 MMOL/L (ref 3.5–5.3)
PROT SERPL-MCNC: 6.9 G/DL (ref 6.1–8.1)
SODIUM SERPL-SCNC: 140 MMOL/L (ref 135–146)

## 2025-02-03 LAB
ALBUMIN SERPL-MCNC: 4.3 G/DL (ref 3.6–5.1)
ALP SERPL-CCNC: 40 U/L (ref 37–153)
ALT SERPL-CCNC: 16 U/L (ref 6–29)
ANION GAP SERPL CALCULATED.4IONS-SCNC: 12 MMOL/L (CALC) (ref 7–17)
AST SERPL-CCNC: 21 U/L (ref 10–35)
BILIRUB SERPL-MCNC: 0.4 MG/DL (ref 0.2–1.2)
BUN SERPL-MCNC: 23 MG/DL (ref 7–25)
CALCIUM SERPL-MCNC: 9.4 MG/DL (ref 8.6–10.4)
CHLORIDE SERPL-SCNC: 107 MMOL/L (ref 98–110)
CO2 SERPL-SCNC: 21 MMOL/L (ref 20–32)
CREAT SERPL-MCNC: 1.31 MG/DL (ref 0.6–0.95)
CRP SERPL-MCNC: <3 MG/L
EGFRCR SERPLBLD CKD-EPI 2021: 40 ML/MIN/1.73M2
GLUCOSE SERPL-MCNC: 171 MG/DL (ref 65–139)
POTASSIUM SERPL-SCNC: 5 MMOL/L (ref 3.5–5.3)
PROT SERPL-MCNC: 6.9 G/DL (ref 6.1–8.1)
SODIUM SERPL-SCNC: 140 MMOL/L (ref 135–146)

## 2025-02-11 ENCOUNTER — PHARMACY VISIT (OUTPATIENT)
Dept: PHARMACY | Facility: CLINIC | Age: 85
End: 2025-02-11
Payer: MEDICARE

## 2025-02-11 PROCEDURE — RXMED WILLOW AMBULATORY MEDICATION CHARGE

## 2025-02-21 PROCEDURE — RXMED WILLOW AMBULATORY MEDICATION CHARGE

## 2025-02-24 ENCOUNTER — DOCUMENTATION (OUTPATIENT)
Dept: INFUSION THERAPY | Facility: CLINIC | Age: 85
End: 2025-02-24
Payer: MEDICARE

## 2025-02-24 NOTE — PROGRESS NOTES
CLINICAL CLEARANCE FOR OUTPATIENT INJECTION      Patient to be scheduled for New Start of Prolia injections.    For Diagnosis: Osteoporosis    Labs required prior to treatment (place lab orders if not already ordered or completed):  Calcium drawn on:   Lab Results   Component Value Date    CALCIUM 9.4 01/31/2025      Lab Results   Component Value Date    ALBUMIN 4.3 01/31/2025       Lab Results   Component Value Date    CAION 1.27 07/10/2024      Calcium >8.6? Yes   (CA must be >8.6mg/dl or ionized calcium WNL.  Hold / Contact provider if <8.6) (must be within 28 days of scheduled injection)    Lab Results   Component Value Date    EGFR 40 (L) 01/31/2025        (Patients with a eGFR <30 are at increased risk of hypocalcemia)      *If eGFR less than 30 reach out to prescribing provider to discuss need for frequent lab monitoring and supplementation. Recommendation is to Monitor serum calcium weekly for the first month after initiation and then at least monthly thereafter due to increased risk for hypocalcemia.    Calcium and Vitamin D supplement on medication list? Yes    Current Outpatient Medications:     aspirin 81 mg EC tablet, Take 1 tablet (81 mg) by mouth once daily., Disp: , Rfl:     blood sugar diagnostic strip, as directed every 12 hours for 90 days, Disp: , Rfl:     cholecalciferol (Vitamin D3) 25 MCG (1000 UT) capsule, Take 1 capsule (25 mcg) by mouth once daily., Disp: , Rfl:     denosumab (Prolia) 60 mg/mL syringe, Inject 1 mL (60 mg total) under the skin every 6 months., Disp: 1 each, Rfl: 1    flash glucose sensor kit (FreeStyle Júnior 2 Sensor) kit, USE AS DIRECTED TO MONITOR BLOOD SUGAR AND CHANGE EVERY 14 DAYS, Disp: 2 each, Rfl: 11    FreeStyle Júnior 2 [place] misc, Use as instructed with sensors to check blood sugars, Disp: 1 each, Rfl: 0    gabapentin (Neurontin) 100 mg capsule, Take 1 capsule (100 mg) by mouth once daily at bedtime. (Patient taking differently: Take 2 capsules (200 mg) by mouth 2  times a day.), Disp: 30 capsule, Rfl: 1    gabapentin (Neurontin) 100 mg capsule, Take 2 capsules (200 mg) by mouth 2 times a day., Disp: 120 capsule, Rfl: 3    insulin glargine (Lantus Solostar U-100 Insulin) 100 unit/mL (3 mL) pen, Inject 50 Units under the skin once daily with breakfast AND 28 Units once daily in the evening. Take with meals., Disp: 70.2 mL, Rfl: 3    lidocaine (Lidoderm) 5 % patch, 1 patch remove after 12 hours Externally Once a day prn, Disp: , Rfl:     lisinopril 5 mg tablet, Take 1 tablet (5 mg) by mouth once daily., Disp: 90 tablet, Rfl: 3    lovastatin (Mevacor) 40 mg tablet, Take 2 tablets (80 mg) by mouth once daily., Disp: 180 tablet, Rfl: 3    metFORMIN XR (Glucophage-XR) 500 mg 24 hr tablet, Take 2 tablets (1,000 mg) by mouth once daily with breakfast AND 1 tablet (500 mg) once daily in the evening. Take with meals., Disp: 270 tablet, Rfl: 3    metoprolol succinate XL (Toprol-XL) 50 mg 24 hr tablet, Take 1 tablet (50 mg) by mouth once daily., Disp: 90 tablet, Rfl: 3    multivitamin tablet, Take 1 tablet by mouth once daily. One-A-Day Women's 50+ Complete Multivitamin, Disp: , Rfl:     predniSONE (Deltasone) 5 mg tablet, Take 1 tablet (5 mg) by mouth once daily., Disp: 90 tablet, Rfl: 1    sertraline (Zoloft) 100 mg tablet, Take 1 tablet (100 mg) by mouth once daily., Disp: 90 tablet, Rfl: 3    zolpidem (Ambien) 5 mg tablet, Take 1 tablet (5 mg) by mouth as needed at bedtime for sleep., Disp: 90 tablet, Rfl: 1   (if no nurse to encourage discussion of supplementation at visit)    No obvious recent dental work per chart review. Nurse to confirm no dental within past/next 4 weeks at encounter.    Urine Hcg test ordered prior to first injection?No (If female pt <60 years of age and with reproductive capability)  (If not ordered and is indicated place order)    Last injection received:  (if continuation)    Due: New start   Ok to schedule for prolia within 28 days of the calcium lab draw  date listed above. OR… Ok to schedule for prolia; please instruct pt to have labs drawn no more than 28 days prior to their scheduled appointment

## 2025-02-25 ENCOUNTER — PHARMACY VISIT (OUTPATIENT)
Dept: PHARMACY | Facility: CLINIC | Age: 85
End: 2025-02-25
Payer: MEDICARE

## 2025-02-25 PROCEDURE — RXMED WILLOW AMBULATORY MEDICATION CHARGE

## 2025-03-13 ENCOUNTER — PHARMACY VISIT (OUTPATIENT)
Dept: PHARMACY | Facility: CLINIC | Age: 85
End: 2025-03-13
Payer: MEDICARE

## 2025-03-13 PROCEDURE — RXMED WILLOW AMBULATORY MEDICATION CHARGE

## 2025-03-13 RX ORDER — AMOXICILLIN 500 MG/1
CAPSULE ORAL
Qty: 21 CAPSULE | Refills: 0 | OUTPATIENT
Start: 2025-03-13

## 2025-03-13 RX ORDER — ACETAMINOPHEN AND CODEINE PHOSPHATE 120; 12 MG/5ML; MG/5ML
SOLUTION ORAL
Qty: 118 ML | Refills: 0 | OUTPATIENT
Start: 2025-03-13

## 2025-03-28 LAB
25(OH)D3+25(OH)D2 SERPL-MCNC: 26 NG/ML (ref 30–100)
ALBUMIN SERPL-MCNC: 4.1 G/DL (ref 3.6–5.1)
ALBUMIN/GLOB SERPL: 1.5 (CALC) (ref 1–2.5)
ALP SERPL-CCNC: 39 U/L (ref 37–153)
ALT SERPL-CCNC: 16 U/L (ref 6–29)
AST SERPL-CCNC: 19 U/L (ref 10–35)
BASOPHILS # BLD AUTO: 58 CELLS/UL (ref 0–200)
BASOPHILS NFR BLD AUTO: 0.6 %
BILIRUB DIRECT SERPL-MCNC: 0.1 MG/DL
BILIRUB INDIRECT SERPL-MCNC: 0.4 MG/DL (CALC) (ref 0.2–1.2)
BILIRUB SERPL-MCNC: 0.5 MG/DL (ref 0.2–1.2)
BUN SERPL-MCNC: 26 MG/DL (ref 7–25)
BUN/CREAT SERPL: 20 (CALC) (ref 6–22)
CALCIUM SERPL-MCNC: 9.6 MG/DL (ref 8.6–10.4)
CHLORIDE SERPL-SCNC: 105 MMOL/L (ref 98–110)
CHOLEST SERPL-MCNC: 188 MG/DL
CHOLEST/HDLC SERPL: 3.4 (CALC)
CO2 SERPL-SCNC: 25 MMOL/L (ref 20–32)
CREAT SERPL-MCNC: 1.29 MG/DL (ref 0.6–0.95)
EGFRCR SERPLBLD CKD-EPI 2021: 41 ML/MIN/1.73M2
EOSINOPHIL # BLD AUTO: 192 CELLS/UL (ref 15–500)
EOSINOPHIL NFR BLD AUTO: 2 %
ERYTHROCYTE [DISTWIDTH] IN BLOOD BY AUTOMATED COUNT: 12.9 % (ref 11–15)
EST. AVERAGE GLUCOSE BLD GHB EST-MCNC: 171 MG/DL
EST. AVERAGE GLUCOSE BLD GHB EST-SCNC: 9.5 MMOL/L
GLOBULIN SER CALC-MCNC: 2.7 G/DL (CALC) (ref 1.9–3.7)
GLUCOSE SERPL-MCNC: 208 MG/DL (ref 65–99)
HBA1C MFR BLD: 7.6 % OF TOTAL HGB
HCT VFR BLD AUTO: 36.1 % (ref 35–45)
HDLC SERPL-MCNC: 55 MG/DL
HGB BLD-MCNC: 11.7 G/DL (ref 11.7–15.5)
LDLC SERPL CALC-MCNC: ABNORMAL MG/DL
LYMPHOCYTES # BLD AUTO: 1978 CELLS/UL (ref 850–3900)
LYMPHOCYTES NFR BLD AUTO: 20.6 %
MCH RBC QN AUTO: 29.5 PG (ref 27–33)
MCHC RBC AUTO-ENTMCNC: 32.4 G/DL (ref 32–36)
MCV RBC AUTO: 91.2 FL (ref 80–100)
MONOCYTES # BLD AUTO: 691 CELLS/UL (ref 200–950)
MONOCYTES NFR BLD AUTO: 7.2 %
NEUTROPHILS # BLD AUTO: 6682 CELLS/UL (ref 1500–7800)
NEUTROPHILS NFR BLD AUTO: 69.6 %
NONHDLC SERPL-MCNC: 133 MG/DL (CALC)
PLATELET # BLD AUTO: 211 THOUSAND/UL (ref 140–400)
PMV BLD REES-ECKER: 9.4 FL (ref 7.5–12.5)
POTASSIUM SERPL-SCNC: 4.1 MMOL/L (ref 3.5–5.3)
PROT SERPL-MCNC: 6.8 G/DL (ref 6.1–8.1)
RBC # BLD AUTO: 3.96 MILLION/UL (ref 3.8–5.1)
SODIUM SERPL-SCNC: 139 MMOL/L (ref 135–146)
TRIGL SERPL-MCNC: 532 MG/DL
TSH SERPL-ACNC: 2.03 MIU/L (ref 0.4–4.5)
WBC # BLD AUTO: 9.6 THOUSAND/UL (ref 3.8–10.8)

## 2025-04-09 ENCOUNTER — OFFICE VISIT (OUTPATIENT)
Dept: PRIMARY CARE | Facility: CLINIC | Age: 85
End: 2025-04-09
Payer: MEDICARE

## 2025-04-09 VITALS
HEART RATE: 93 BPM | BODY MASS INDEX: 27.38 KG/M2 | WEIGHT: 145 LBS | DIASTOLIC BLOOD PRESSURE: 60 MMHG | HEIGHT: 61 IN | OXYGEN SATURATION: 96 % | SYSTOLIC BLOOD PRESSURE: 122 MMHG

## 2025-04-09 DIAGNOSIS — E11.65 TYPE 2 DIABETES MELLITUS WITH HYPERGLYCEMIA, WITH LONG-TERM CURRENT USE OF INSULIN: ICD-10-CM

## 2025-04-09 DIAGNOSIS — F32.A DEPRESSION, UNSPECIFIED DEPRESSION TYPE: ICD-10-CM

## 2025-04-09 DIAGNOSIS — Z79.4 TYPE 2 DIABETES MELLITUS WITH HYPERGLYCEMIA, WITH LONG-TERM CURRENT USE OF INSULIN: ICD-10-CM

## 2025-04-09 DIAGNOSIS — I10 PRIMARY HYPERTENSION: ICD-10-CM

## 2025-04-09 DIAGNOSIS — Z23 ENCOUNTER FOR IMMUNIZATION: ICD-10-CM

## 2025-04-09 DIAGNOSIS — M15.0 PRIMARY OSTEOARTHRITIS INVOLVING MULTIPLE JOINTS: ICD-10-CM

## 2025-04-09 DIAGNOSIS — G63 POLYNEUROPATHY ASSOCIATED WITH UNDERLYING DISEASE: ICD-10-CM

## 2025-04-09 DIAGNOSIS — N18.32 STAGE 3B CHRONIC KIDNEY DISEASE (MULTI): ICD-10-CM

## 2025-04-09 DIAGNOSIS — Z00.00 ANNUAL PHYSICAL EXAM: Primary | ICD-10-CM

## 2025-04-09 DIAGNOSIS — Z71.89 COUNSELING REGARDING ADVANCED CARE PLANNING AND GOALS OF CARE: ICD-10-CM

## 2025-04-09 DIAGNOSIS — E78.2 MIXED HYPERLIPIDEMIA: ICD-10-CM

## 2025-04-09 DIAGNOSIS — M81.0 AGE-RELATED OSTEOPOROSIS WITHOUT CURRENT PATHOLOGICAL FRACTURE: ICD-10-CM

## 2025-04-09 DIAGNOSIS — E55.9 VITAMIN D DEFICIENCY: ICD-10-CM

## 2025-04-09 DIAGNOSIS — F41.9 ANXIETY: ICD-10-CM

## 2025-04-09 DIAGNOSIS — M31.6 TEMPORAL ARTERITIS (MULTI): ICD-10-CM

## 2025-04-09 DIAGNOSIS — Z01.89 ENCOUNTER FOR ROUTINE LABORATORY TESTING: ICD-10-CM

## 2025-04-09 DIAGNOSIS — Z79.899 POLYPHARMACY: ICD-10-CM

## 2025-04-09 PROCEDURE — 1123F ACP DISCUSS/DSCN MKR DOCD: CPT | Performed by: STUDENT IN AN ORGANIZED HEALTH CARE EDUCATION/TRAINING PROGRAM

## 2025-04-09 PROCEDURE — G0439 PPPS, SUBSEQ VISIT: HCPCS | Performed by: STUDENT IN AN ORGANIZED HEALTH CARE EDUCATION/TRAINING PROGRAM

## 2025-04-09 PROCEDURE — 99215 OFFICE O/P EST HI 40 MIN: CPT | Mod: 25 | Performed by: STUDENT IN AN ORGANIZED HEALTH CARE EDUCATION/TRAINING PROGRAM

## 2025-04-09 PROCEDURE — 1036F TOBACCO NON-USER: CPT | Performed by: STUDENT IN AN ORGANIZED HEALTH CARE EDUCATION/TRAINING PROGRAM

## 2025-04-09 PROCEDURE — G2211 COMPLEX E/M VISIT ADD ON: HCPCS | Performed by: STUDENT IN AN ORGANIZED HEALTH CARE EDUCATION/TRAINING PROGRAM

## 2025-04-09 PROCEDURE — 99214 OFFICE O/P EST MOD 30 MIN: CPT | Performed by: STUDENT IN AN ORGANIZED HEALTH CARE EDUCATION/TRAINING PROGRAM

## 2025-04-09 PROCEDURE — 1126F AMNT PAIN NOTED NONE PRSNT: CPT | Performed by: STUDENT IN AN ORGANIZED HEALTH CARE EDUCATION/TRAINING PROGRAM

## 2025-04-09 PROCEDURE — 3078F DIAST BP <80 MM HG: CPT | Performed by: STUDENT IN AN ORGANIZED HEALTH CARE EDUCATION/TRAINING PROGRAM

## 2025-04-09 PROCEDURE — 1159F MED LIST DOCD IN RCRD: CPT | Performed by: STUDENT IN AN ORGANIZED HEALTH CARE EDUCATION/TRAINING PROGRAM

## 2025-04-09 PROCEDURE — 95251 CONT GLUC MNTR ANALYSIS I&R: CPT | Performed by: STUDENT IN AN ORGANIZED HEALTH CARE EDUCATION/TRAINING PROGRAM

## 2025-04-09 PROCEDURE — 3074F SYST BP LT 130 MM HG: CPT | Performed by: STUDENT IN AN ORGANIZED HEALTH CARE EDUCATION/TRAINING PROGRAM

## 2025-04-09 PROCEDURE — 1160F RVW MEDS BY RX/DR IN RCRD: CPT | Performed by: STUDENT IN AN ORGANIZED HEALTH CARE EDUCATION/TRAINING PROGRAM

## 2025-04-09 PROCEDURE — 1170F FXNL STATUS ASSESSED: CPT | Performed by: STUDENT IN AN ORGANIZED HEALTH CARE EDUCATION/TRAINING PROGRAM

## 2025-04-09 PROCEDURE — 1158F ADVNC CARE PLAN TLK DOCD: CPT | Performed by: STUDENT IN AN ORGANIZED HEALTH CARE EDUCATION/TRAINING PROGRAM

## 2025-04-09 ASSESSMENT — PAIN SCALES - GENERAL: PAINLEVEL_OUTOF10: 0-NO PAIN

## 2025-04-09 ASSESSMENT — ACTIVITIES OF DAILY LIVING (ADL)
GROCERY_SHOPPING: INDEPENDENT
BATHING: INDEPENDENT
MANAGING_FINANCES: INDEPENDENT
DRESSING: INDEPENDENT
TAKING_MEDICATION: INDEPENDENT
DOING_HOUSEWORK: INDEPENDENT

## 2025-04-09 ASSESSMENT — ENCOUNTER SYMPTOMS
EYES NEGATIVE: 1
CARDIOVASCULAR NEGATIVE: 1
HEMATOLOGIC/LYMPHATIC NEGATIVE: 1
RESPIRATORY NEGATIVE: 1
PSYCHIATRIC NEGATIVE: 1
ENDOCRINE NEGATIVE: 1
GASTROINTESTINAL NEGATIVE: 1
MUSCULOSKELETAL NEGATIVE: 1
ALLERGIC/IMMUNOLOGIC NEGATIVE: 1
CONSTITUTIONAL NEGATIVE: 1
NEUROLOGICAL NEGATIVE: 1

## 2025-04-09 ASSESSMENT — PATIENT HEALTH QUESTIONNAIRE - PHQ9
2. FEELING DOWN, DEPRESSED OR HOPELESS: NOT AT ALL
SUM OF ALL RESPONSES TO PHQ9 QUESTIONS 1 AND 2: 0
1. LITTLE INTEREST OR PLEASURE IN DOING THINGS: NOT AT ALL

## 2025-04-09 NOTE — PROGRESS NOTES
[facility]: [facility]  MEDICARE WELLNESS EXAM      Sheila Sanchez is a 84 y.o. female that is presenting today for Annual Exam.    Assessment/Plan    - Patient noting that the people in her life are telling her that she needs to wear her hearing aides more frequently; however, she is feeling that her hearing is relatively okay. Discussed with the patient that she needs to be wearing the hearing aides because hearing impairment is associated with cognitive decline.  - Otherwise, the patient feels well and denies any acute symptoms / concerns at this time.  - Blood pressure at goal today.  - Encouraged continued dietary, exercise, and lifestyle modification.  - Significant medication and problem list reconciliation done today.   - Today's appointment is to keep the patient in compliance with their controlled substance agreement (CSA).   - The patient needs to sign a new CSA since the previous one has . Discussed with the patient that they would be provided a paper copy and that they could also find this on their MyChart. Patient voiced understanding and agreement.  - The patient has completed their urine drug screen (UDS) this year.  - I have considered the risks of abuse, dependence, addiction, and/or diversion and have discussed these with the patient during our appointment.  - I do believe that the medication(s) are medically necessary. Patient has tried multiple alternatives to controlled substances in the past with limited success.  - The patient's symptoms are well-controlled on current therapy.  - PDMP reviewed and appropriate.    Discussed routine and/or preventative care with the patient as outlined below:  - Labwork:   - Patient had labwork done for this appointment. Discussed today.    - Kidney function stable. Do not need to make changes at this time.  - Mild Vitamin D deficiency. Will have her increase the patient's OTC supplementation.  - T2DM well-controlled by  hemoglobin A1c. On reviewing the patient's CGM report, her sugars are somewhat elevated overnight and into the early morning, and I suspect that this is the patient eating a bit at night to make sure that she does not drop low. I don't think that we need to make aggressive changes.  - Remainder of labwork looked great.  - Will order labwork for the patient's next appointment. Encouraged the patient to get this labwork done one week prior to the next appointment.  - Imaging:   - Encouraged the patient to continue to get their routine annual diabetic retinal exam.  - Patient does not appear to be due for routine imaging at this time.  - Immunizations:   - Encouraged the patient to get their shingles (shingrix) immunizations.     ADVANCED CARE PLANNING  Advanced Care Planning was discussed with patient.  Encouraged the patient to confirm that Living Will and Healthcare Power of  (HCPoA) are accurate and up to date.  Encouraged the patient to confirm that our office be provided a copy of any documentation in the event that anything changes.    Diagnoses and all orders for this visit:  Annual physical exam  -     Follow Up In Primary Care  Counseling regarding advanced care planning and goals of care  Encounter for routine laboratory testing  Encounter for immunization  Type 2 diabetes mellitus with hyperglycemia, with long-term current use of insulin  -     Follow Up In Primary Care; Future  -     Hemoglobin A1C; Future  Age-related osteoporosis without current pathological fracture  -     Follow Up In Primary Care; Future  Polyneuropathy associated with underlying disease  -     Follow Up In Primary Care; Future  Primary osteoarthritis involving multiple joints  -     Follow Up In Primary Care; Future  Depression, unspecified depression type  -     Follow Up In Primary Care; Future  Stage 3b chronic kidney disease (Multi)  -     Follow Up In Primary Care; Future  -     Comprehensive Metabolic Panel; Future  -      CBC and Auto Differential; Future  Temporal arteritis (Multi)  -     Follow Up In Primary Care; Future  Mixed hyperlipidemia  -     Follow Up In Primary Care; Future  Primary hypertension  -     Follow Up In Primary Care; Future  Vitamin D deficiency  -     Follow Up In Primary Care; Future  Polypharmacy  -     Follow Up In Primary Care; Future  -     Opiate/Opioid/Benzo Prescription Compliance; Future  Anxiety  -     Follow Up In Primary Care; Future    Current Outpatient Medications   Medication Instructions    amoxicillin (Amoxil) 500 mg capsule Take 1 capsule by mouth three times a day until gone    aspirin 81 mg, oral, Daily    blood sugar diagnostic strip as directed every 12 hours for 90 days    cholecalciferol (VITAMIN D3) 25 mcg, oral, Daily    denosumab (PROLIA) 60 mg, subcutaneous, Every 6 months    flash glucose sensor kit (FreeStyle Júnior 2 Sensor) kit USE AS DIRECTED TO MONITOR BLOOD SUGAR AND CHANGE EVERY 14 DAYS    FreeStyle Júnior 2 [place] misc Use as instructed with sensors to check blood sugars    insulin glargine (Lantus Solostar U-100 Insulin) 100 unit/mL (3 mL) pen Inject 50 Units under the skin once daily with breakfast AND 28 Units once daily in the evening. Take with meals.    lidocaine (Lidoderm) 5 % patch 1 patch remove after 12 hours Externally Once a day prn    lisinopril 5 mg, oral, Daily    lovastatin (MEVACOR) 80 mg, oral, Daily    metFORMIN XR (Glucophage-XR) 500 mg 24 hr tablet Take 2 tablets (1,000 mg) by mouth once daily with breakfast AND 1 tablet (500 mg) once daily in the evening. Take with meals.    metoprolol succinate XL (TOPROL-XL) 50 mg, oral, Daily    multivitamin tablet 1 tablet, oral, Daily, One-A-Day Women's 50+ Complete Multivitamin    predniSONE (DELTASONE) 5 mg, oral, Daily    sertraline (ZOLOFT) 100 mg, oral, Daily    zolpidem (AMBIEN) 5 mg, oral, Nightly PRN     Subjective   - The patient otherwise feels well and denies any acute symptoms or concerns at this  time.  - The patient denies any changes or progression of their chronic medical problems.  - The patient denies any problems or concerns with their medications.      Review of Systems   Constitutional: Negative.    HENT: Negative.     Eyes: Negative.    Respiratory: Negative.     Cardiovascular: Negative.    Gastrointestinal: Negative.    Endocrine: Negative.    Genitourinary: Negative.    Musculoskeletal: Negative.    Skin: Negative.    Allergic/Immunologic: Negative.    Neurological: Negative.    Hematological: Negative.    Psychiatric/Behavioral: Negative.     All other systems reviewed and are negative.    Objective   Vitals:    04/09/25 1035   BP: 122/60   Pulse: 93   SpO2: 96%      Body mass index is 27.41 kg/m².  Physical Exam  Vitals and nursing note reviewed.   Constitutional:       General: She is not in acute distress.     Appearance: Normal appearance. She is not ill-appearing.   HENT:      Head: Normocephalic and atraumatic.      Right Ear: Tympanic membrane, ear canal and external ear normal. There is no impacted cerumen.      Left Ear: Tympanic membrane, ear canal and external ear normal. There is no impacted cerumen.      Nose: Nose normal.      Mouth/Throat:      Mouth: Mucous membranes are moist.      Pharynx: Oropharynx is clear. No oropharyngeal exudate or posterior oropharyngeal erythema.   Eyes:      General: No scleral icterus.        Right eye: No discharge.         Left eye: No discharge.      Extraocular Movements: Extraocular movements intact.      Conjunctiva/sclera: Conjunctivae normal.      Pupils: Pupils are equal, round, and reactive to light.   Neck:      Vascular: No carotid bruit.   Cardiovascular:      Rate and Rhythm: Normal rate and regular rhythm.      Pulses: Normal pulses.      Heart sounds: Normal heart sounds. No murmur heard.     No friction rub. No gallop.   Pulmonary:      Effort: Pulmonary effort is normal. No respiratory distress.      Breath sounds: Normal breath  sounds.   Abdominal:      General: Abdomen is flat. Bowel sounds are normal. There is no distension.      Palpations: Abdomen is soft.      Tenderness: There is no abdominal tenderness.      Hernia: No hernia is present.   Musculoskeletal:         General: No swelling or tenderness. Normal range of motion.   Lymphadenopathy:      Cervical: No cervical adenopathy.   Skin:     General: Skin is warm and dry.      Capillary Refill: Capillary refill takes less than 2 seconds.      Coloration: Skin is not jaundiced.      Findings: No rash.   Neurological:      General: No focal deficit present.      Mental Status: She is alert and oriented to person, place, and time. Mental status is at baseline.   Psychiatric:         Mood and Affect: Mood normal.         Behavior: Behavior normal.       Diagnostic Results   Lab Results   Component Value Date    GLUCOSE 208 (H) 03/27/2025    CALCIUM 9.6 03/27/2025     03/27/2025    K 4.1 03/27/2025    CO2 25 03/27/2025     03/27/2025    BUN 26 (H) 03/27/2025    CREATININE 1.29 (H) 03/27/2025     Lab Results   Component Value Date    ALT 16 03/27/2025    AST 19 03/27/2025    ALKPHOS 39 03/27/2025    BILITOT 0.5 03/27/2025     Lab Results   Component Value Date    WBC 9.6 03/27/2025    HGB 11.7 03/27/2025    HCT 36.1 03/27/2025    MCV 91.2 03/27/2025     03/27/2025     Lab Results   Component Value Date    CHOL 188 03/27/2025    CHOL 183 02/21/2024    CHOL 181 02/24/2023     Lab Results   Component Value Date    HDL 55 03/27/2025    HDL 53.0 02/21/2024    HDL 46 (L) 02/24/2023     Lab Results   Component Value Date    LDLCALC  03/27/2025      Comment:         LDL cholesterol not calculated. Triglyceride levels  greater than 400 mg/dL invalidate calculated LDL results.           Reference range: <100     Desirable range <100 mg/dL for primary prevention;    <70 mg/dL for patients with CHD or diabetic patients   with > or = 2 CHD risk factors.     LDL-C is now calculated  "using the Nithya   calculation, which is a validated novel method providing   better accuracy than the Friedewald equation in the   estimation of LDL-C.   Herve BASHIR et al. ROVERTO. 2013;310(19): 2414-9015   (http://Mapado.Typekit/faq/TMR734)      LDLCALC 72 02/21/2024    LDLCALC 71 02/24/2023     Lab Results   Component Value Date    TRIG 532 (H) 03/27/2025    TRIG 289 (H) 02/21/2024    TRIG 319 (H) 02/24/2023     No components found for: \"CHOLHDL\"  Lab Results   Component Value Date    HGBA1C 7.6 (H) 03/27/2025     Other labs not included in the list above reviewed either before or during this encounter.    History   History reviewed. No pertinent past medical history.  History reviewed. No pertinent surgical history.  No family history on file.  Social History     Socioeconomic History    Marital status:      Spouse name: Not on file    Number of children: Not on file    Years of education: Not on file    Highest education level: Not on file   Occupational History    Not on file   Tobacco Use    Smoking status: Never    Smokeless tobacco: Never   Vaping Use    Vaping status: Never Used   Substance and Sexual Activity    Alcohol use: Never    Drug use: Never    Sexual activity: Not on file   Other Topics Concern    Not on file   Social History Narrative    Not on file     Social Drivers of Health     Financial Resource Strain: Not on file   Food Insecurity: Not on file   Transportation Needs: Not on file   Physical Activity: Not on file   Stress: Not on file   Social Connections: Not on file   Intimate Partner Violence: Not on file   Housing Stability: Not on file     Allergies   Allergen Reactions    Alendronate Other     stomach upset    Ibandronate Other     stomach upset    Meperidine Other     stomach upset    Tetanus Toxoid Adsorbed Unknown     Current Outpatient Medications on File Prior to Visit   Medication Sig Dispense Refill    aspirin 81 mg EC tablet Take 1 tablet (81 mg) by " mouth once daily.      blood sugar diagnostic strip as directed every 12 hours for 90 days      cholecalciferol (Vitamin D3) 25 MCG (1000 UT) capsule Take 1 capsule (25 mcg) by mouth once daily.      denosumab (Prolia) 60 mg/mL syringe Inject 1 mL (60 mg total) under the skin every 6 months. 1 each 1    flash glucose sensor kit (FreeStyle Júnior 2 Sensor) kit USE AS DIRECTED TO MONITOR BLOOD SUGAR AND CHANGE EVERY 14 DAYS 2 each 11    FreeStyle Júnior 2 [place] misc Use as instructed with sensors to check blood sugars 1 each 0    insulin glargine (Lantus Solostar U-100 Insulin) 100 unit/mL (3 mL) pen Inject 50 Units under the skin once daily with breakfast AND 28 Units once daily in the evening. Take with meals. 70.2 mL 3    lidocaine (Lidoderm) 5 % patch 1 patch remove after 12 hours Externally Once a day prn      lisinopril 5 mg tablet Take 1 tablet (5 mg) by mouth once daily. 90 tablet 3    lovastatin (Mevacor) 40 mg tablet Take 2 tablets (80 mg) by mouth once daily. 180 tablet 3    metFORMIN XR (Glucophage-XR) 500 mg 24 hr tablet Take 2 tablets (1,000 mg) by mouth once daily with breakfast AND 1 tablet (500 mg) once daily in the evening. Take with meals. 270 tablet 3    metoprolol succinate XL (Toprol-XL) 50 mg 24 hr tablet Take 1 tablet (50 mg) by mouth once daily. 90 tablet 3    multivitamin tablet Take 1 tablet by mouth once daily. One-A-Day Women's 50+ Complete Multivitamin      predniSONE (Deltasone) 5 mg tablet Take 1 tablet (5 mg) by mouth once daily. 90 tablet 1    sertraline (Zoloft) 100 mg tablet Take 1 tablet (100 mg) by mouth once daily. 90 tablet 3    zolpidem (Ambien) 5 mg tablet Take 1 tablet (5 mg) by mouth as needed at bedtime for sleep. 90 tablet 1    amoxicillin (Amoxil) 500 mg capsule Take 1 capsule by mouth three times a day until gone 21 capsule 0    [DISCONTINUED] acetaminophen with codeine (acetaminophen-codeine) 120-12 mg/5 mL solution oral solution Take 15mL (3 teaspoonsful) by mouth every  4 hours as needed for pain 118 mL 0    [DISCONTINUED] gabapentin (Neurontin) 100 mg capsule Take 1 capsule (100 mg) by mouth once daily at bedtime. (Patient not taking: Reported on 4/9/2025) 30 capsule 1    [DISCONTINUED] gabapentin (Neurontin) 100 mg capsule Take 2 capsules (200 mg) by mouth 2 times a day. (Patient not taking: Reported on 4/9/2025) 120 capsule 3     No current facility-administered medications on file prior to visit.     Immunization History   Administered Date(s) Administered    DT (pediatric) 08/21/2023    Flu vaccine, quadrivalent, high-dose, preservative free, age 65y+ (FLUZONE) 09/19/2021, 10/05/2022, 09/08/2023    Flu vaccine, trivalent, preservative free, HIGH-DOSE, age 65y+ (Fluzone) 12/10/2019, 09/28/2020, 10/10/2024    Moderna COVID-19 vaccine, 12 years and older (50mcg/0.5mL)(Spikevax) 10/16/2024    Moderna COVID-19 vaccine, bivalent, blue cap/gray label *Check age/dose* 10/09/2022    Moderna SARS-CoV-2 Vaccination 01/22/2021, 02/19/2021, 11/13/2021    Pneumococcal conjugate vaccine, 13-valent (PREVNAR 13) 12/05/2018    Pneumococcal polysaccharide vaccine, 23-valent, age 2 years and older (PNEUMOVAX 23) 10/14/2004, 08/01/2012, 09/28/2020    RSV, 60 Years And Older (AREXVY) 10/18/2024    Tdap vaccine, age 7 year and older (BOOSTRIX, ADACEL) 09/28/2020    Zoster vaccine, recombinant, adult (SHINGRIX) 03/02/2021    Zoster, live 02/01/2013     Patient's medical history was reviewed and updated either before or during this encounter.     Rg Traore MD

## 2025-04-09 NOTE — PATIENT INSTRUCTIONS
- Patient noting that the people in her life are telling her that she needs to wear her hearing aides more frequently; however, she is feeling that her hearing is relatively okay. Discussed with the patient that she needs to be wearing the hearing aides because hearing impairment is associated with cognitive decline.  - Otherwise, the patient feels well and denies any acute symptoms / concerns at this time.  - Blood pressure at goal today.  - Encouraged continued dietary, exercise, and lifestyle modification.  - Significant medication and problem list reconciliation done today.   - Today's appointment is to keep the patient in compliance with their controlled substance agreement (CSA).   - The patient needs to sign a new CSA since the previous one has . Discussed with the patient that they would be provided a paper copy and that they could also find this on their MyChart. Patient voiced understanding and agreement.  - The patient has completed their urine drug screen (UDS) this year.  - I have considered the risks of abuse, dependence, addiction, and/or diversion and have discussed these with the patient during our appointment.  - I do believe that the medication(s) are medically necessary. Patient has tried multiple alternatives to controlled substances in the past with limited success.  - The patient's symptoms are well-controlled on current therapy.  - PDMP reviewed and appropriate.    Discussed routine and/or preventative care with the patient as outlined below:  - Labwork:   - Patient had labwork done for this appointment. Discussed today.    - Kidney function stable. Do not need to make changes at this time.  - Mild Vitamin D deficiency. Will have her increase the patient's OTC supplementation.  - T2DM well-controlled by hemoglobin A1c. On reviewing the patient's CGM report, her sugars are somewhat elevated overnight and into the early morning, and I suspect that this is the patient eating a bit at  night to make sure that she does not drop low. I don't think that we need to make aggressive changes.  - Remainder of labwork looked great.  - Will order labwork for the patient's next appointment. Encouraged the patient to get this labwork done one week prior to the next appointment.  - Imaging:   - Encouraged the patient to continue to get their routine annual diabetic retinal exam.  - Patient does not appear to be due for routine imaging at this time.  - Immunizations:   - Encouraged the patient to get their shingles (shingrix) immunizations.     ADVANCED CARE PLANNING  Advanced Care Planning was discussed with patient.  Encouraged the patient to confirm that Living Will and Healthcare Power of  (HCPoA) are accurate and up to date.  Encouraged the patient to confirm that our office be provided a copy of any documentation in the event that anything changes.

## 2025-04-12 LAB
1OH-MIDAZOLAM UR-MCNC: NEGATIVE NG/ML
7AMINOCLONAZEPAM UR-MCNC: NEGATIVE NG/ML
A-OH ALPRAZ UR-MCNC: NEGATIVE NG/ML
A-OH-TRIAZOLAM UR-MCNC: NEGATIVE NG/ML
AMPHETAMINES UR QL: NEGATIVE NG/ML
BARBITURATES UR QL: NEGATIVE NG/ML
BZE UR QL: NEGATIVE NG/ML
CODEINE UR-MCNC: NEGATIVE NG/ML
CREAT UR-MCNC: 164.3 MG/DL
DRUG SCREEN COMMENT UR-IMP: ABNORMAL
EDDP UR-MCNC: NEGATIVE NG/ML
FENTANYL UR-MCNC: NEGATIVE NG/ML
HYDROCODONE UR-MCNC: NEGATIVE NG/ML
HYDROMORPHONE UR-MCNC: NEGATIVE NG/ML
LORAZEPAM UR-MCNC: NEGATIVE NG/ML
METHADONE UR-MCNC: NEGATIVE NG/ML
MORPHINE UR-MCNC: NEGATIVE NG/ML
NORDIAZEPAM UR-MCNC: NEGATIVE NG/ML
NORFENTANYL UR-MCNC: NEGATIVE NG/ML
NORHYDROCODONE UR CFM-MCNC: NEGATIVE NG/ML
NOROXYCODONE UR CFM-MCNC: NEGATIVE NG/ML
NORTRAMADOL UR-MCNC: NEGATIVE NG/ML
OH-ETHYLFLURAZ UR-MCNC: NEGATIVE NG/ML
OXAZEPAM UR-MCNC: NEGATIVE NG/ML
OXIDANTS UR QL: NEGATIVE MCG/ML
OXYCODONE UR CFM-MCNC: NEGATIVE NG/ML
OXYMORPHONE UR CFM-MCNC: NEGATIVE NG/ML
PCP UR QL: NEGATIVE NG/ML
PH UR: 5.2 [PH] (ref 4.5–9)
QUEST 6 ACETYLMORPHINE: NEGATIVE NG/ML
QUEST NOTES AND COMMENTS: ABNORMAL
QUEST ZOLPIDEM: 123 NG/ML
TEMAZEPAM UR-MCNC: NEGATIVE NG/ML
THC UR QL: NEGATIVE NG/ML
TRAMADOL UR-MCNC: NEGATIVE NG/ML
ZOLPIDEM PHENYL-4-CARB UR CFM-MCNC: >2500 NG/ML

## 2025-04-16 DIAGNOSIS — M31.6 TEMPORAL ARTERITIS (MULTI): ICD-10-CM

## 2025-04-16 RX ORDER — PREDNISONE 5 MG/1
5 TABLET ORAL DAILY
Qty: 90 TABLET | Refills: 1 | Status: SHIPPED | OUTPATIENT
Start: 2025-04-16 | End: 2025-10-13

## 2025-04-17 ENCOUNTER — APPOINTMENT (OUTPATIENT)
Dept: PRIMARY CARE | Facility: CLINIC | Age: 85
End: 2025-04-17
Payer: MEDICARE

## 2025-05-06 ENCOUNTER — PHARMACY VISIT (OUTPATIENT)
Dept: PHARMACY | Facility: CLINIC | Age: 85
End: 2025-05-06
Payer: MEDICARE

## 2025-05-06 DIAGNOSIS — G63 POLYNEUROPATHY ASSOCIATED WITH UNDERLYING DISEASE: ICD-10-CM

## 2025-05-06 DIAGNOSIS — E78.2 MIXED HYPERLIPIDEMIA: ICD-10-CM

## 2025-05-06 DIAGNOSIS — I10 PRIMARY HYPERTENSION: ICD-10-CM

## 2025-05-06 DIAGNOSIS — F32.A DEPRESSION, UNSPECIFIED DEPRESSION TYPE: ICD-10-CM

## 2025-05-06 DIAGNOSIS — F41.9 ANXIETY: ICD-10-CM

## 2025-05-06 PROCEDURE — RXMED WILLOW AMBULATORY MEDICATION CHARGE

## 2025-05-06 RX ORDER — SERTRALINE HYDROCHLORIDE 100 MG/1
100 TABLET, FILM COATED ORAL DAILY
Qty: 90 TABLET | Refills: 3 | Status: SHIPPED | OUTPATIENT
Start: 2025-05-06 | End: 2026-05-06

## 2025-05-06 RX ORDER — LISINOPRIL 5 MG/1
5 TABLET ORAL DAILY
Qty: 90 TABLET | Refills: 3 | Status: SHIPPED | OUTPATIENT
Start: 2025-05-06 | End: 2026-05-06

## 2025-05-06 RX ORDER — LOVASTATIN 40 MG/1
80 TABLET ORAL DAILY
Qty: 180 TABLET | Refills: 3 | Status: SHIPPED | OUTPATIENT
Start: 2025-05-06 | End: 2026-05-06

## 2025-05-06 RX ORDER — METOPROLOL SUCCINATE 50 MG/1
50 TABLET, EXTENDED RELEASE ORAL DAILY
Qty: 90 TABLET | Refills: 3 | Status: SHIPPED | OUTPATIENT
Start: 2025-05-06 | End: 2026-05-06

## 2025-05-06 RX ORDER — GABAPENTIN 100 MG/1
100 CAPSULE ORAL NIGHTLY
Qty: 30 CAPSULE | Refills: 1 | Status: SHIPPED | OUTPATIENT
Start: 2025-05-06 | End: 2025-07-05

## 2025-05-12 ENCOUNTER — PHARMACY VISIT (OUTPATIENT)
Dept: PHARMACY | Facility: CLINIC | Age: 85
End: 2025-05-12
Payer: MEDICARE

## 2025-05-12 DIAGNOSIS — Z79.4 TYPE 2 DIABETES MELLITUS WITH OTHER SPECIFIED COMPLICATION, WITH LONG-TERM CURRENT USE OF INSULIN: ICD-10-CM

## 2025-05-12 DIAGNOSIS — E11.69 TYPE 2 DIABETES MELLITUS WITH OTHER SPECIFIED COMPLICATION, WITH LONG-TERM CURRENT USE OF INSULIN: ICD-10-CM

## 2025-05-12 PROCEDURE — RXOTC WILLOW AMBULATORY OTC CHARGE

## 2025-05-12 PROCEDURE — RXMED WILLOW AMBULATORY MEDICATION CHARGE

## 2025-05-12 RX ORDER — FLASH GLUCOSE SCANNING READER
EACH MISCELLANEOUS
Qty: 1 EACH | Refills: 0 | Status: SHIPPED | OUTPATIENT
Start: 2025-05-12

## 2025-05-12 RX ORDER — METFORMIN HYDROCHLORIDE 500 MG/1
TABLET, EXTENDED RELEASE ORAL
Qty: 270 TABLET | Refills: 3 | Status: SHIPPED | OUTPATIENT
Start: 2025-05-12 | End: 2026-05-12

## 2025-05-22 DIAGNOSIS — F41.9 ANXIETY: ICD-10-CM

## 2025-05-22 DIAGNOSIS — F32.A DEPRESSION, UNSPECIFIED DEPRESSION TYPE: ICD-10-CM

## 2025-05-22 PROCEDURE — RXMED WILLOW AMBULATORY MEDICATION CHARGE

## 2025-05-22 RX ORDER — ZOLPIDEM TARTRATE 5 MG/1
5 TABLET ORAL NIGHTLY PRN
Qty: 90 TABLET | Refills: 1 | Status: SHIPPED | OUTPATIENT
Start: 2025-05-22

## 2025-05-23 ENCOUNTER — PHARMACY VISIT (OUTPATIENT)
Dept: PHARMACY | Facility: CLINIC | Age: 85
End: 2025-05-23
Payer: MEDICARE

## 2025-07-09 ENCOUNTER — PHARMACY VISIT (OUTPATIENT)
Dept: PHARMACY | Facility: CLINIC | Age: 85
End: 2025-07-09
Payer: MEDICARE

## 2025-07-09 PROCEDURE — RXMED WILLOW AMBULATORY MEDICATION CHARGE

## 2025-08-07 DIAGNOSIS — Z79.4 TYPE 2 DIABETES MELLITUS WITH HYPERGLYCEMIA, WITH LONG-TERM CURRENT USE OF INSULIN: Primary | ICD-10-CM

## 2025-08-07 DIAGNOSIS — E11.65 TYPE 2 DIABETES MELLITUS WITH HYPERGLYCEMIA, WITH LONG-TERM CURRENT USE OF INSULIN: Primary | ICD-10-CM

## 2025-08-08 ENCOUNTER — CLINICAL SUPPORT (OUTPATIENT)
Dept: PRIMARY CARE | Facility: CLINIC | Age: 85
End: 2025-08-08
Payer: MEDICARE

## 2025-08-08 ENCOUNTER — PHARMACY VISIT (OUTPATIENT)
Dept: PHARMACY | Facility: CLINIC | Age: 85
End: 2025-08-08
Payer: MEDICARE

## 2025-08-08 DIAGNOSIS — Z79.4 TYPE 2 DIABETES MELLITUS WITH OTHER SPECIFIED COMPLICATION, WITH LONG-TERM CURRENT USE OF INSULIN: ICD-10-CM

## 2025-08-08 DIAGNOSIS — E11.65 TYPE 2 DIABETES MELLITUS WITH HYPERGLYCEMIA, WITH LONG-TERM CURRENT USE OF INSULIN: Primary | ICD-10-CM

## 2025-08-08 DIAGNOSIS — Z79.4 TYPE 2 DIABETES MELLITUS WITH HYPERGLYCEMIA, WITH LONG-TERM CURRENT USE OF INSULIN: Primary | ICD-10-CM

## 2025-08-08 DIAGNOSIS — N18.32 STAGE 3B CHRONIC KIDNEY DISEASE (MULTI): ICD-10-CM

## 2025-08-08 DIAGNOSIS — E11.69 TYPE 2 DIABETES MELLITUS WITH OTHER SPECIFIED COMPLICATION, WITH LONG-TERM CURRENT USE OF INSULIN: ICD-10-CM

## 2025-08-08 PROCEDURE — RXMED WILLOW AMBULATORY MEDICATION CHARGE

## 2025-08-08 RX ORDER — METFORMIN HYDROCHLORIDE 500 MG/1
TABLET, EXTENDED RELEASE ORAL
Status: SHIPPED
Start: 2025-08-08 | End: 2026-08-08

## 2025-08-08 NOTE — PROGRESS NOTES
"Patient is sent at the request of Rg Traore MD for my opinion regarding Type 2 diabetes.  My final recommendations will be communicated back to the requesting provider by way of shared medical record.    Subjective   HPI    Current diet: in general, a \"healthy\" diet    Current exercise: housecleaning and yard work    Allergies[1]    Current monitoring regimen:   Patient is using: continuous glucose monitor  Freestyle Júnior 2  Uses reader  Above target 32%  In target 78%  Below target 0%  GMI 7.2%  -see attached documents  -report reviewed with patient    Any episodes of hypoglycemia? yes - patient has had about a low a day, right under 70    Adverse Effects:   Some stomach upset from the metformin - otherwise no other side effects    Objective     There were no vitals taken for this visit.    Diabetes Pharmacotherapy:    Lantus 50 units subQ in the morning and 28 units subQ in the evening  Metformin  mg two tablets in the morning and 1 tablet in the evening    Ozempic or Trulicty $237  Jardiance $0  Januvia $0     Secondary Prevention:   - Statin? Yes  - ACE-I/ARB? Yes  - Aspirin? Yes    Lab Review  Lab Results   Component Value Date    BILITOT 0.5 03/27/2025    CALCIUM 9.6 03/27/2025    CO2 25 03/27/2025     03/27/2025    CREATININE 1.29 (H) 03/27/2025    GLUCOSE 208 (H) 03/27/2025    ALKPHOS 39 03/27/2025    K 4.1 03/27/2025    PROT 6.8 03/27/2025     03/27/2025    AST 19 03/27/2025    ALT 16 03/27/2025    BUN 26 (H) 03/27/2025    ANIONGAP 12 01/31/2025    ALBUMIN 4.1 03/27/2025    LIPASE 21 02/10/2023     Lab Results   Component Value Date    TRIG 532 (H) 03/27/2025    CHOL 188 03/27/2025    LDLCALC  03/27/2025      Comment:         LDL cholesterol not calculated. Triglyceride levels  greater than 400 mg/dL invalidate calculated LDL results.           Reference range: <100     Desirable range <100 mg/dL for primary prevention;    <70 mg/dL for patients with CHD or diabetic patients "   with > or = 2 CHD risk factors.     LDL-C is now calculated using the Nithya   calculation, which is a validated novel method providing   better accuracy than the Friedewald equation in the   estimation of LDL-C.   Herve BASHIR et al. ROVERTO. 2013;310(19): 7002-0584   (http://education.Third Solutions.Artimi/faq/ZHO217)      HDL 55 03/27/2025     Lab Results   Component Value Date    HGBA1C 7.6 (H) 03/27/2025    HGBA1C 6.6 (H) 10/10/2024    HGBA1C 7.3 (H) 02/21/2024     The ASCVD Risk score (Jay DK, et al., 2019) failed to calculate for the following reasons:    The 2019 ASCVD risk score is only valid for ages 40 to 79        Assessment/Plan   Problem List Items Addressed This Visit    None      Patients diabetes well controlled with most recent A1c of 7.6%, patient continues to have low blood sugars and high post prandial sugar spikes (Goal < 8%). Need to minimize low blood sugars.  Start Januvia 50 mg once daily  Decrease  Lantus to 50 units subQ in the morning and 26 units at bedtime  Metformin 500 mg ER twice daily  Decreasing due to most recent eGFR 40  Compliance at present is estimated to be good.   Education Provided to Patient:      Patient to call the office if she has continued lows and we will decrease her evening dose of Lantus by an additional 2 units    Turned off high alarm and signal loss alarm on reader. Only alarm that should go off is the low alarm. Also reset the time on her reader so it should be correct now, was 4 hours early    Follow-up: I recommend diabetes care be PRN.  PCP Follow-Up: 2 months    Megan Lloyd, PharmD, BCPS    Continue all meds under the continuation of care with the referring provider and clinical pharmacy team.         [1]   Allergies  Allergen Reactions    Alendronate Other     stomach upset    Ibandronate Other     stomach upset    Meperidine Other     stomach upset    Tetanus Toxoid Adsorbed Unknown

## 2025-08-18 ENCOUNTER — TELEPHONE (OUTPATIENT)
Dept: PRIMARY CARE | Facility: CLINIC | Age: 85
End: 2025-08-18
Payer: MEDICARE

## 2025-08-18 DIAGNOSIS — Z12.31 SCREENING MAMMOGRAM, ENCOUNTER FOR: ICD-10-CM

## 2025-08-20 ENCOUNTER — PHARMACY VISIT (OUTPATIENT)
Dept: PHARMACY | Facility: CLINIC | Age: 85
End: 2025-08-20
Payer: MEDICARE

## 2025-08-20 PROCEDURE — RXMED WILLOW AMBULATORY MEDICATION CHARGE

## 2025-08-21 DIAGNOSIS — I10 PRIMARY HYPERTENSION: ICD-10-CM

## 2025-08-21 DIAGNOSIS — N18.32 STAGE 3B CHRONIC KIDNEY DISEASE (MULTI): ICD-10-CM

## 2025-09-05 ENCOUNTER — PHARMACY VISIT (OUTPATIENT)
Dept: PHARMACY | Facility: CLINIC | Age: 85
End: 2025-09-05
Payer: MEDICARE

## 2025-09-05 DIAGNOSIS — Z79.4 TYPE 2 DIABETES MELLITUS WITH OTHER SPECIFIED COMPLICATION, WITH LONG-TERM CURRENT USE OF INSULIN: ICD-10-CM

## 2025-09-05 DIAGNOSIS — E11.69 TYPE 2 DIABETES MELLITUS WITH OTHER SPECIFIED COMPLICATION, WITH LONG-TERM CURRENT USE OF INSULIN: ICD-10-CM

## 2025-09-05 PROCEDURE — RXMED WILLOW AMBULATORY MEDICATION CHARGE

## 2025-09-05 RX ORDER — METFORMIN HYDROCHLORIDE 500 MG/1
TABLET, EXTENDED RELEASE ORAL
Qty: 270 TABLET | Refills: 3 | Status: SHIPPED | OUTPATIENT
Start: 2025-09-05 | End: 2026-09-05